# Patient Record
Sex: MALE | Race: WHITE | HISPANIC OR LATINO | Employment: OTHER | URBAN - METROPOLITAN AREA
[De-identification: names, ages, dates, MRNs, and addresses within clinical notes are randomized per-mention and may not be internally consistent; named-entity substitution may affect disease eponyms.]

---

## 2017-05-17 ENCOUNTER — HOSPITAL ENCOUNTER (EMERGENCY)
Facility: OTHER | Age: 62
Discharge: LEFT BEFORE DISCHARGE | End: 2017-05-17
Attending: EMERGENCY MEDICINE
Payer: MEDICAID

## 2017-05-17 VITALS
HEART RATE: 79 BPM | DIASTOLIC BLOOD PRESSURE: 73 MMHG | SYSTOLIC BLOOD PRESSURE: 119 MMHG | RESPIRATION RATE: 18 BRPM | HEIGHT: 68 IN | OXYGEN SATURATION: 100 % | BODY MASS INDEX: 27.28 KG/M2 | WEIGHT: 180 LBS

## 2017-05-17 DIAGNOSIS — R00.2 PALPITATIONS: Primary | ICD-10-CM

## 2017-05-17 DIAGNOSIS — R07.9 CHEST PAIN: ICD-10-CM

## 2017-05-17 PROCEDURE — 99283 EMERGENCY DEPT VISIT LOW MDM: CPT

## 2017-05-17 PROCEDURE — 93005 ELECTROCARDIOGRAM TRACING: CPT

## 2017-05-17 PROCEDURE — 93010 ELECTROCARDIOGRAM REPORT: CPT | Mod: ,,, | Performed by: INTERNAL MEDICINE

## 2017-05-18 NOTE — ED NOTES
"Spouse of patient walks to nursing station states " he left , he is inpatient he does not like to wait" pt seen walking down [address] to exit door. Encourage pt to come back, insisted on leaving. Md notified. Pt eloped.  "

## 2017-05-18 NOTE — ED PROVIDER NOTES
"Encounter Date: 5/17/2017       History     Chief Complaint   Patient presents with    Tachycardia     pt c/o feeling like his heart is racing and his BP is elevated, pt is normotensive and HR WDL in triage, pt c/o "a lil bit" of CP and sob     Review of patient's allergies indicates:  No Known Allergies  Patient is a 61 y.o. male presenting with the following complaint: chest pain. The history is provided by the patient.   Chest Pain   The current episode started just prior to arrival (Patient's main complaint is he felt his heart beating fast). Episode frequency: Occurred once. The chest pain is resolved. At its most intense, the chest pain is at 7/10. The chest pain is currently at 0/10. The quality of the pain is described as burning. The pain radiates to the epigastrium. Chest pain is worsened by smoking. Primary symptoms include palpitations. Pertinent negatives for primary symptoms include no fever, no shortness of breath, no cough, no abdominal pain, no nausea, no vomiting and no dizziness.   The palpitations began just prior to arrival. An episode of palpitations lasts for less than 5 minutes. The palpitations have occurred 1 time(s). Circumstances: Patient has been consuming heavy alcohol today.  He has a bottle of whiskey in his back pocket. The palpitations did not occur with dizziness or shortness of breath.     Pertinent negatives for associated symptoms include no lower extremity edema and no near-syncope. He tried nothing for the symptoms. Risk factors include alcohol intake. Past medical history comments: Patient has no past medical history     Past Medical History:   Diagnosis Date    Depression      History reviewed. No pertinent surgical history.  No family history on file.  Social History   Substance Use Topics    Smoking status: Current Every Day Smoker    Smokeless tobacco: None    Alcohol use Yes     Review of Systems   Constitutional: Negative.  Negative for fever.   HENT: Negative.  "   Eyes: Negative.    Respiratory: Negative.  Negative for cough and shortness of breath.    Cardiovascular: Positive for chest pain and palpitations. Negative for near-syncope.   Gastrointestinal: Negative.  Negative for abdominal pain, nausea and vomiting.   Endocrine: Negative.    Genitourinary: Negative.    Musculoskeletal: Negative.    Skin: Negative.    Allergic/Immunologic: Negative.    Neurological: Negative.  Negative for dizziness.   Hematological: Negative.    Psychiatric/Behavioral: Negative.    All other systems reviewed and are negative.      Physical Exam   Initial Vitals   BP Pulse Resp Temp SpO2   -- -- -- -- --            Physical Exam    Nursing note and vitals reviewed.  Constitutional: Vital signs are normal. He appears well-developed. He is active and cooperative.   Smells heavily of tobacco and alcohol.  Patient is here with his wife.  He has normal ambulation   HENT:   Head: Normocephalic and atraumatic.   Eyes: Conjunctivae, EOM and lids are normal. Pupils are equal, round, and reactive to light.   Neck: Trachea normal and full passive range of motion without pain. Neck supple. No thyroid mass present.   Cardiovascular: Normal rate, regular rhythm, S1 normal, S2 normal, normal heart sounds, intact distal pulses and normal pulses.   Abdominal: Soft. Normal appearance, normal aorta and bowel sounds are normal.   Musculoskeletal: Normal range of motion.   Lymphadenopathy:     He has no axillary adenopathy.   Neurological: He is alert and oriented to person, place, and time.   Skin: Skin is warm, dry and intact.   Psychiatric: He has a normal mood and affect. His speech is normal and behavior is normal. Judgment and thought content normal. Cognition and memory are normal.         ED Course   Procedures  Labs Reviewed - No data to display  EKG Readings: (Independently Interpreted)   Rhythm: Normal Sinus Rhythm. Ectopy: No Ectopy. Conduction: Normal. ST Segments: Normal ST Segments. T Waves:  Normal. Clinical Impression: Normal Sinus Rhythm          Medical Decision Making:   ED Management:  Prior to the patient's blood being drawn the patient decided that he did not want to be evaluated in the department.  He just one that his blood pressure checked and his heart rate checked.  I asked the patient to stay to allow me to draw some blood work on him just to make certain that he did not have any electrolyte or kidney dysfunction he refused any further care treatment and the left the emergency department without signing an AMA form or receiving any discharge instructions.  The patient left as an elopement.  My general impression is the patient suffers from alcoholism and may have been having some withdrawal symptoms.  His vital signs are completely normal plan was to draw labs and if they were unremarkable discharge the patient home.                   ED Course     Clinical Impression:   The encounter diagnosis was Palpitations.          Yamil Hitchcock MD  05/17/17 5485

## 2017-05-23 ENCOUNTER — HOSPITAL ENCOUNTER (EMERGENCY)
Facility: OTHER | Age: 62
Discharge: HOME | End: 2017-05-24
Attending: EMERGENCY MEDICINE
Payer: MEDICAID

## 2017-05-23 VITALS
HEIGHT: 67 IN | WEIGHT: 168 LBS | SYSTOLIC BLOOD PRESSURE: 110 MMHG | RESPIRATION RATE: 18 BRPM | BODY MASS INDEX: 26.37 KG/M2 | TEMPERATURE: 97 F | HEART RATE: 78 BPM | DIASTOLIC BLOOD PRESSURE: 79 MMHG

## 2017-05-23 DIAGNOSIS — S91.331A PUNCTURE WOUND OF RIGHT FOOT, INITIAL ENCOUNTER: ICD-10-CM

## 2017-05-23 DIAGNOSIS — S90.851A FOREIGN BODY IN RIGHT FOOT, INITIAL ENCOUNTER: Primary | ICD-10-CM

## 2017-05-23 PROCEDURE — 28190 REMOVAL OF FOOT FOREIGN BODY: CPT

## 2017-05-23 PROCEDURE — 99283 EMERGENCY DEPT VISIT LOW MDM: CPT | Mod: 25

## 2017-05-23 RX ORDER — DOXYCYCLINE 100 MG/1
100 CAPSULE ORAL 2 TIMES DAILY
Qty: 20 CAPSULE | Refills: 0 | Status: SHIPPED | OUTPATIENT
Start: 2017-05-23 | End: 2017-06-02

## 2017-05-23 RX ADMIN — CLOSTRIDIUM TETANI TOXOID ANTIGEN (FORMALDEHYDE INACTIVATED), CORYNEBACTERIUM DIPHTHERIAE TOXOID ANTIGEN (FORMALDEHYDE INACTIVATED), BORDETELLA PERTUSSIS TOXOID ANTIGEN (GLUTARALDEHYDE INACTIVATED), BORDETELLA PERTUSSIS FILAMENTOUS HEMAGGLUTININ ANTIGEN (FORMALDEHYDE INACTIVATED), BORDETELLA PERTUSSIS PERTACTIN ANTIGEN, AND BORDETELLA PERTUSSIS FIMBRIAE 2/3 ANTIGEN 0.5 ML: 5; 2; 2.5; 5; 3; 5 INJECTION, SUSPENSION INTRAMUSCULAR at 11:05

## 2017-05-24 PROCEDURE — 63600175 PHARM REV CODE 636 W HCPCS: Performed by: EMERGENCY MEDICINE

## 2017-05-24 PROCEDURE — 90471 IMMUNIZATION ADMIN: CPT | Performed by: EMERGENCY MEDICINE

## 2017-05-24 PROCEDURE — 90715 TDAP VACCINE 7 YRS/> IM: CPT | Performed by: EMERGENCY MEDICINE

## 2017-05-24 NOTE — ED PROVIDER NOTES
Encounter Date: 5/23/2017       History     Chief Complaint   Patient presents with    foreign body in foot     pt presents to ER with c/o a fish hook stuck in his rt foot.       Review of patient's allergies indicates:  No Known Allergies    Foreign Body    The current episode started just prior to arrival. Intake: embedded in skin of  right foot. Suspected object: fish hook. The incident was witnessed/reported by the patient.     Past Medical History:   Diagnosis Date    Depression      History reviewed. No pertinent surgical history.  History reviewed. No pertinent family history.  Social History   Substance Use Topics    Smoking status: Current Every Day Smoker    Smokeless tobacco: Not on file    Alcohol use Yes     Review of Systems   Skin: Positive for wound.   Neurological: Negative for weakness and numbness.   All other systems reviewed and are negative.      Physical Exam     Initial Vitals [05/23/17 2320]   BP Pulse Resp Temp SpO2   110/79 78 18 97.4 °F (36.3 °C) --     Physical Exam    Nursing note and vitals reviewed.  Constitutional: He appears well-developed and well-nourished. He is not diaphoretic. No distress.   HENT:   Head: Normocephalic and atraumatic.   Mouth/Throat: Oropharynx is clear and moist. No oropharyngeal exudate.   Eyes: EOM are normal. Pupils are equal, round, and reactive to light. No scleral icterus.   Neck: Normal range of motion. Neck supple.   Cardiovascular: Normal rate, regular rhythm and intact distal pulses.   No murmur heard.  Pulmonary/Chest: Breath sounds normal. No stridor. No respiratory distress. He has no wheezes. He has no rhonchi. He exhibits no tenderness.   Abdominal: Soft. Bowel sounds are normal. There is no tenderness.   Musculoskeletal: Normal range of motion. He exhibits no edema.   Neurological: He is alert and oriented to person, place, and time. He has normal strength.   Skin: Skin is warm. Capillary refill takes less than 2 seconds. No pallor.    Psychiatric: He has a normal mood and affect.         ED Course   Foreign Body  Date/Time: 5/23/2017 11:40 PM  Performed by: NICOLASA JANSEN  Authorized by: NICOLASA JANSEN   Consent Done: Not Needed  Body area: skin  General location: lower extremity  Location details: right foot  Anesthesia: local infiltration    Anesthesia:  Anesthesia: local infiltration  Local Anesthetic: lidocaine 1% with epinephrine   Anesthetic total: 3 mL  Patient sedated: no  Patient restrained: no  Patient cooperative: yes  Localization method: visualized  Removal mechanism: hemostat and scalpel  Dressing: dressing applied  Tendon involvement: none  Depth: subcutaneous  Complexity: simple  1 objects recovered.  Objects recovered: fish hook  Post-procedure assessment: foreign body removed  Patient tolerance: Patient tolerated the procedure well with no immediate complications  Comments: Neurovascular status intact before and after procedure done.      Labs Reviewed - No data to display                            ED Course     Labs Reviewed  No visits with results within 1 Day(s) from this visit.   Latest known visit with results is:   No results found for any previous visit.        Imaging Reviewed    Imaging Results    None         Medications given in ED    Medications   Tdap vaccine injection 0.5 mL (0.5 mLs Intramuscular Given 5/23/17 2346)       Discharge Medications     Discharge Medication List as of 5/23/2017 11:48 PM      START taking these medications    Details   doxycycline (VIBRAMYCIN) 100 MG Cap Take 1 capsule (100 mg total) by mouth 2 (two) times daily., Starting Tue 5/23/2017, Until Fri 6/2/2017, Normal                   Patient discharged to home in stable condition with instructions to:   1. Please take all meds as prescribed.  2. Follow-up with your primary care doctor   3. Return precautions discussed and patient and/or family/caretaker understands to return to the emergency room for any concerns including worsening of  your current symptoms, fever, chills, night sweats, worsening pain, chest pain, shortness of breath, nausea, vomiting, diarrhea, bleeding, headache, difficulty talking, visual disturbances, weakness, numbness or any other acute concerns    Clinical Impression:   The primary encounter diagnosis was Foreign body in right foot, initial encounter. A diagnosis of Puncture wound of right foot, initial encounter was also pertinent to this visit.          Aj Aguillon MD  05/24/17 0106

## 2017-05-24 NOTE — ED TRIAGE NOTES
Pt presents to ER with c/o a fish hook getting stuck in his rt foot.  The hook is piercing the top portion of his foot and the other portion of the hook is through his shoe.  He attempted to remove it himself with pliers but was unable secondary to the pain.

## 2017-07-15 ENCOUNTER — NURSE TRIAGE (OUTPATIENT)
Dept: ADMINISTRATIVE | Facility: CLINIC | Age: 62
End: 2017-07-15

## 2017-07-16 NOTE — TELEPHONE ENCOUNTER
"pt fishing this am asked to be picked up, + SOB. BP low, face flushed. HR=90  BP 61/? At 7pm,  Given one of wife's nerve pills. Pt hasnt eaten. Pt not weak. Pt refuses ambulance. rec ED. Pt has a lot of anxiety. Denies plan to hurt himself or others.     Reason for Disposition   [1] Systolic BP < 80 AND [2] NOT dizzy, lightheaded or weak    Answer Assessment - Initial Assessment Questions  1. BLOOD PRESSURE: "What is the blood pressure?" "Did you take at least two measurements 5 minutes apart?"     61/ ? , not weak, not sweating.   2. ONSET: "When did you take your blood pressure?"     7pm   3. HOW: "How did you obtain the blood pressure?" (e.g., visiting nurse, automatic home BP monitor)     Home   4. HISTORY: "Do you have a history of low blood pressure?" "What is your blood pressure normally?"     Nothing   5. MEDICATIONS: "Are you taking any medications for blood pressure?" If yes: "Have they been changed recently?"     No meds   6. PULSE RATE: "Do you know what your pulse rate is?"      90 now down  7. OTHER SYMPTOMS: "Have you been sick recently?" "Have you had a recent injury?"     No    Protocols used: ST LOW BLOOD PRESSURE-A-    Reiterated need for EMS due to low BP, SOB, pt refuses rec ED. rec lay with feet elevated and offer fluids. Call back with questions.   "

## 2018-05-10 ENCOUNTER — NURSE TRIAGE (OUTPATIENT)
Dept: ADMINISTRATIVE | Facility: CLINIC | Age: 63
End: 2018-05-10

## 2018-05-10 NOTE — TELEPHONE ENCOUNTER
Chest pain in chest and b/p 61/46 pulse 74.States he feels like someone is walking on his chest.  Advised to call EMS. Caller wanting to ask about how to interpret b/p readings. Advised his b/p is low and advised twice again the importance of hanging up now and calling EMS. Caller ended call abruptly.    Reason for Disposition   Sounds like a life-threatening emergency to the triager    Protocols used: ST CHEST PAIN-A-AH

## 2019-03-23 ENCOUNTER — OFFICE VISIT (OUTPATIENT)
Dept: URGENT CARE | Facility: CLINIC | Age: 64
End: 2019-03-23
Payer: MEDICARE

## 2019-03-23 VITALS
SYSTOLIC BLOOD PRESSURE: 93 MMHG | TEMPERATURE: 97 F | WEIGHT: 168 LBS | BODY MASS INDEX: 26.37 KG/M2 | DIASTOLIC BLOOD PRESSURE: 65 MMHG | HEART RATE: 78 BPM | RESPIRATION RATE: 20 BRPM | HEIGHT: 67 IN

## 2019-03-23 DIAGNOSIS — R05.9 COUGH: ICD-10-CM

## 2019-03-23 DIAGNOSIS — J06.9 UPPER RESPIRATORY TRACT INFECTION, UNSPECIFIED TYPE: Primary | ICD-10-CM

## 2019-03-23 DIAGNOSIS — H93.8X1 CONGESTION OF RIGHT EAR: ICD-10-CM

## 2019-03-23 PROCEDURE — 3008F BODY MASS INDEX DOCD: CPT | Mod: CPTII,S$GLB,, | Performed by: PHYSICIAN ASSISTANT

## 2019-03-23 PROCEDURE — 99213 OFFICE O/P EST LOW 20 MIN: CPT | Mod: S$GLB,,, | Performed by: PHYSICIAN ASSISTANT

## 2019-03-23 PROCEDURE — 99213 PR OFFICE/OUTPT VISIT, EST, LEVL III, 20-29 MIN: ICD-10-PCS | Mod: S$GLB,,, | Performed by: PHYSICIAN ASSISTANT

## 2019-03-23 PROCEDURE — 3008F PR BODY MASS INDEX (BMI) DOCUMENTED: ICD-10-PCS | Mod: CPTII,S$GLB,, | Performed by: PHYSICIAN ASSISTANT

## 2019-03-23 RX ORDER — METHOCARBAMOL 750 MG/1
750 TABLET, FILM COATED ORAL 2 TIMES DAILY PRN
Refills: 3 | COMMUNITY
Start: 2019-02-14 | End: 2020-10-05

## 2019-03-23 RX ORDER — PROMETHAZINE HYDROCHLORIDE AND DEXTROMETHORPHAN HYDROBROMIDE 6.25; 15 MG/5ML; MG/5ML
5 SYRUP ORAL NIGHTLY PRN
Qty: 180 ML | Refills: 0 | Status: SHIPPED | OUTPATIENT
Start: 2019-03-23 | End: 2019-04-02

## 2019-03-23 RX ORDER — LORATADINE 10 MG/1
10 TABLET ORAL DAILY
Qty: 30 TABLET | Refills: 0 | Status: SHIPPED | OUTPATIENT
Start: 2019-03-23 | End: 2022-04-05

## 2019-03-23 RX ORDER — ESCITALOPRAM OXALATE 20 MG/1
20 TABLET ORAL DAILY
Refills: 3 | COMMUNITY
Start: 2019-02-14 | End: 2020-10-05 | Stop reason: SDUPTHER

## 2019-03-23 RX ORDER — AMOXICILLIN AND CLAVULANATE POTASSIUM 500; 125 MG/1; MG/1
1 TABLET, FILM COATED ORAL 2 TIMES DAILY
Qty: 20 TABLET | Refills: 0 | Status: SHIPPED | OUTPATIENT
Start: 2019-03-23 | End: 2019-04-02

## 2019-03-23 RX ORDER — PREDNISONE 5 MG/1
5 TABLET ORAL DAILY
Qty: 5 TABLET | Refills: 0 | Status: SHIPPED | OUTPATIENT
Start: 2019-03-23 | End: 2019-03-28

## 2019-03-23 NOTE — PROGRESS NOTES
"Subjective:       Patient ID: Leonie Denis is a 63 y.o. male.    Vitals:  height is 5' 7" (1.702 m) and weight is 76.2 kg (168 lb). His temperature is 97.4 °F (36.3 °C). His blood pressure is 93/65 and his pulse is 78. His respiration is 20.     Chief Complaint: Cough    For a week pt has been having a cough and right ear fullness. He has used an ear wax removal that did help and when he cough his ear would clear up. He has been coughing up clear light green mucus also. He too cold and flu medication , cough medication and aspirin .      Cough   This is a new problem. The current episode started in the past 7 days. The problem has been unchanged. The problem occurs constantly. The cough is productive of sputum. Associated symptoms include ear pain. Pertinent negatives include no chills, eye redness, fever, hemoptysis, myalgias, rash, sore throat, shortness of breath or wheezing. Nothing aggravates the symptoms. He has tried OTC cough suppressant for the symptoms. The treatment provided no relief.       Constitution: Negative for chills, sweating, fatigue and fever.   HENT: Positive for ear pain. Negative for congestion, sinus pain, sinus pressure, sore throat and voice change.    Neck: Negative for painful lymph nodes.   Eyes: Negative for eye redness.   Respiratory: Positive for cough. Negative for chest tightness, sputum production, bloody sputum, COPD, shortness of breath, stridor, wheezing and asthma.    Gastrointestinal: Negative for nausea and vomiting.   Musculoskeletal: Negative for muscle ache.   Skin: Negative for rash.   Allergic/Immunologic: Negative for seasonal allergies and asthma.   Hematologic/Lymphatic: Negative for swollen lymph nodes.       Patient presents with a cough, sinus and right ear congestion with decreased hearing for about a week. Patient has been trying to pop his ear by holding his nose and blowing, which works for a few minutes and then recurs with congestion. He has also tried to " clean out his ear with peroxide which does not help his sx at all.  Objective:      Physical Exam   Constitutional: He is oriented to person, place, and time. He appears well-developed and well-nourished. He is cooperative.  Non-toxic appearance. He does not appear ill. No distress.   HENT:   Head: Normocephalic and atraumatic.   Right Ear: Hearing, external ear and ear canal normal. There is swelling. No tenderness. Tympanic membrane is erythematous and bulging. A middle ear effusion is present.   Left Ear: Hearing, tympanic membrane, external ear and ear canal normal.   Nose: Mucosal edema and rhinorrhea present. No nasal deformity. No epistaxis. Right sinus exhibits no maxillary sinus tenderness and no frontal sinus tenderness. Left sinus exhibits no maxillary sinus tenderness and no frontal sinus tenderness.   Mouth/Throat: Uvula is midline and mucous membranes are normal. No trismus in the jaw. Normal dentition. No uvula swelling.   Eyes: Conjunctivae and lids are normal. No scleral icterus.   Sclera clear bilat   Neck: Trachea normal, full passive range of motion without pain and phonation normal. Neck supple.   Cardiovascular: Normal rate, regular rhythm, normal heart sounds, intact distal pulses and normal pulses.   Pulmonary/Chest: Effort normal and breath sounds normal. No stridor. No respiratory distress. He has no decreased breath sounds. He has no wheezes. He has no rhonchi. He has no rales.   Abdominal: Soft. Normal appearance and bowel sounds are normal. He exhibits no distension. There is no tenderness.   Musculoskeletal: Normal range of motion. He exhibits no edema or deformity.   Neurological: He is alert and oriented to person, place, and time. He exhibits normal muscle tone. Coordination normal.   Skin: Skin is warm, dry and intact. He is not diaphoretic. No pallor.   Psychiatric: He has a normal mood and affect. His speech is normal and behavior is normal. Judgment and thought content normal.  Cognition and memory are normal.   Nursing note and vitals reviewed.      Assessment:       1. Upper respiratory tract infection, unspecified type    2. Congestion of right ear    3. Cough        Plan:         Upper respiratory tract infection, unspecified type  -     predniSONE (DELTASONE) 5 MG tablet; Take 1 tablet (5 mg total) by mouth once daily. for 5 days  Dispense: 5 tablet; Refill: 0  -     loratadine (CLARITIN) 10 mg tablet; Take 1 tablet (10 mg total) by mouth once daily.  Dispense: 30 tablet; Refill: 0    Congestion of right ear  -     amoxicillin-clavulanate 500-125mg (AUGMENTIN) 500-125 mg Tab; Take 1 tablet (500 mg total) by mouth 2 (two) times daily. for 10 days  Dispense: 20 tablet; Refill: 0  -     predniSONE (DELTASONE) 5 MG tablet; Take 1 tablet (5 mg total) by mouth once daily. for 5 days  Dispense: 5 tablet; Refill: 0    Cough  -     promethazine-dextromethorphan (PROMETHAZINE-DM) 6.25-15 mg/5 mL Syrp; Take 5 mLs by mouth nightly as needed (for cough at bedtime).  Dispense: 180 mL; Refill: 0    Patient has been asked to stop all previous ear treatments and do only what has been recommended from this point forward.    Patient Instructions     Viral Upper Respiratory Illness (Adult)  You have a viral upper respiratory illness (URI), which is another term for the common cold. This illness is contagious during the first few days. It is spread through the air by coughing and sneezing. It may also be spread by direct contact (touching the sick person and then touching your own eyes, nose, or mouth). Frequent handwashing will decrease risk of spread. Most viral illnesses go away within 7 to 10 days with rest and simple home remedies. Sometimes the illness may last for several weeks. Antibiotics will not kill a virus, and they are generally not prescribed for this condition.    Home care  · If symptoms are severe, rest at home for the first 2 to 3 days. When you resume activity, don't let yourself get  too tired.  · Avoid being exposed to cigarette smoke (yours or others).  · You may use acetaminophen or ibuprofen to control pain and fever, unless another medicine was prescribed. (Note: If you have chronic liver or kidney disease, have ever had a stomach ulcer or gastrointestinal bleeding, or are taking blood-thinning medicines, talk with your healthcare provider before using these medicines.) Aspirin should never be given to anyone under 18 years of age who is ill with a viral infection or fever. It may cause severe liver or brain damage.  · Your appetite may be poor, so a light diet is fine. Avoid dehydration by drinking 6 to 8 glasses of fluids per day (water, soft drinks, juices, tea, or soup). Extra fluids will help loosen secretions in the nose and lungs.  · Over-the-counter cold medicines will not shorten the length of time youre sick, but they may be helpful for the following symptoms: cough, sore throat, and nasal and sinus congestion. (Note: Do not use decongestants if you have high blood pressure.)  Follow-up care  Follow up with your healthcare provider, or as advised.  When to seek medical advice  Call your healthcare provider right away if any of these occur:  · Cough with lots of colored sputum (mucus)  · Severe headache; face, neck, or ear pain  · Difficulty swallowing due to throat pain  · Fever of 100.4°F (38°C)  Call 911, or get immediate medical care  Call emergency services right away if any of these occur:  · Chest pain, shortness of breath, wheezing, or difficulty breathing  · Coughing up blood  · Inability to swallow due to throat pain  Date Last Reviewed: 9/13/2015 © 2000-2017 [employer]. [address] [place] [address], [place] [address]. All rights reserved. This information is not intended as a substitute for professional medical care. Always follow your healthcare professional's instructions.        Anatomy of the Ear    The ear is a complex and delicate organ. It collects  sound waves so you can hear the world around you. The ear also has a second function--it helps you keep your balance. Your ear can be divided into 3 parts. The outer ear and middle ear help collect and amplify sound. The inner ear converts sound waves to messages that are sent to the brain. The inner ear also senses the movement and position of your head and body so you can maintain your balance and see clearly, even when you change positions.  The mastoid bone surrounds the middle ear. The external ear collects sound waves. The ear canal carries sound waves to the eardrum. The eardrum vibrates from sound waves, setting the middle ear bones in motion. The middle ear bones (ossicles) vibrate, transmitting sound waves to the inner ear. When the ear is healthy, air pressure remains balanced in the middle ear. The eustachian tube helps control air pressure in the middle ear. The semicircular canals help maintain balance. The vestibular nerve carries balance signals to the brain. The auditory nerve carries sound signals to the brain. The cochlea picks up sound waves and makes nerve signals.     Date Last Reviewed: 10/1/2016  © 2340-9853 [employer]. [address] [place] [address], [place] [address]. All rights reserved. This information is not intended as a substitute for professional medical care. Always follow your healthcare professional's instructions.

## 2019-03-23 NOTE — PATIENT INSTRUCTIONS
Viral Upper Respiratory Illness (Adult)  You have a viral upper respiratory illness (URI), which is another term for the common cold. This illness is contagious during the first few days. It is spread through the air by coughing and sneezing. It may also be spread by direct contact (touching the sick person and then touching your own eyes, nose, or mouth). Frequent handwashing will decrease risk of spread. Most viral illnesses go away within 7 to 10 days with rest and simple home remedies. Sometimes the illness may last for several weeks. Antibiotics will not kill a virus, and they are generally not prescribed for this condition.    Home care  · If symptoms are severe, rest at home for the first 2 to 3 days. When you resume activity, don't let yourself get too tired.  · Avoid being exposed to cigarette smoke (yours or others).  · You may use acetaminophen or ibuprofen to control pain and fever, unless another medicine was prescribed. (Note: If you have chronic liver or kidney disease, have ever had a stomach ulcer or gastrointestinal bleeding, or are taking blood-thinning medicines, talk with your healthcare provider before using these medicines.) Aspirin should never be given to anyone under 18 years of age who is ill with a viral infection or fever. It may cause severe liver or brain damage.  · Your appetite may be poor, so a light diet is fine. Avoid dehydration by drinking 6 to 8 glasses of fluids per day (water, soft drinks, juices, tea, or soup). Extra fluids will help loosen secretions in the nose and lungs.  · Over-the-counter cold medicines will not shorten the length of time youre sick, but they may be helpful for the following symptoms: cough, sore throat, and nasal and sinus congestion. (Note: Do not use decongestants if you have high blood pressure.)  Follow-up care  Follow up with your healthcare provider, or as advised.  When to seek medical advice  Call your healthcare provider right away if any  of these occur:  · Cough with lots of colored sputum (mucus)  · Severe headache; face, neck, or ear pain  · Difficulty swallowing due to throat pain  · Fever of 100.4°F (38°C)  Call 911, or get immediate medical care  Call emergency services right away if any of these occur:  · Chest pain, shortness of breath, wheezing, or difficulty breathing  · Coughing up blood  · Inability to swallow due to throat pain  Date Last Reviewed: 9/13/2015 © 2000-2017 [employer]. [address] [place] [address]. All rights reserved. This information is not intended as a substitute for professional medical care. Always follow your healthcare professional's instructions.        Anatomy of the Ear    The ear is a complex and delicate organ. It collects sound waves so you can hear the world around you. The ear also has a second function--it helps you keep your balance. Your ear can be divided into 3 parts. The outer ear and middle ear help collect and amplify sound. The inner ear converts sound waves to messages that are sent to the brain. The inner ear also senses the movement and position of your head and body so you can maintain your balance and see clearly, even when you change positions.  The mastoid bone surrounds the middle ear. The external ear collects sound waves. The ear canal carries sound waves to the eardrum. The eardrum vibrates from sound waves, setting the middle ear bones in motion. The middle ear bones (ossicles) vibrate, transmitting sound waves to the inner ear. When the ear is healthy, air pressure remains balanced in the middle ear. The eustachian tube helps control air pressure in the middle ear. The semicircular canals help maintain balance. The vestibular nerve carries balance signals to the brain. The auditory nerve carries sound signals to the brain. The cochlea picks up sound waves and makes nerve signals.     Date Last Reviewed: 10/1/2016  © 2868-0833 [employer]. [facility] [place] [address]. All rights reserved. This information is not intended as a substitute for professional medical care. Always follow your healthcare professional's instructions.

## 2020-06-25 ENCOUNTER — HOSPITAL ENCOUNTER (EMERGENCY)
Facility: HOSPITAL | Age: 65
Discharge: HOME | End: 2020-06-25
Attending: EMERGENCY MEDICINE
Payer: MEDICARE

## 2020-06-25 ENCOUNTER — NURSE TRIAGE (OUTPATIENT)
Dept: ADMINISTRATIVE | Facility: CLINIC | Age: 65
End: 2020-06-25

## 2020-06-25 VITALS
RESPIRATION RATE: 24 BRPM | DIASTOLIC BLOOD PRESSURE: 72 MMHG | TEMPERATURE: 98 F | SYSTOLIC BLOOD PRESSURE: 124 MMHG | BODY MASS INDEX: 27.47 KG/M2 | OXYGEN SATURATION: 97 % | WEIGHT: 175 LBS | HEIGHT: 67 IN | HEART RATE: 68 BPM

## 2020-06-25 DIAGNOSIS — T63.481A INSECT STINGS, ACCIDENTAL OR UNINTENTIONAL, INITIAL ENCOUNTER: Primary | ICD-10-CM

## 2020-06-25 DIAGNOSIS — T78.40XA ALLERGIC REACTION, INITIAL ENCOUNTER: ICD-10-CM

## 2020-06-25 PROCEDURE — 25000003 PHARM REV CODE 250: Mod: ER | Performed by: EMERGENCY MEDICINE

## 2020-06-25 PROCEDURE — 63600175 PHARM REV CODE 636 W HCPCS: Mod: ER | Performed by: EMERGENCY MEDICINE

## 2020-06-25 PROCEDURE — 99284 EMERGENCY DEPT VISIT MOD MDM: CPT | Mod: ER

## 2020-06-25 RX ORDER — DIPHENHYDRAMINE HCL 25 MG
25 CAPSULE ORAL EVERY 6 HOURS PRN
Qty: 20 CAPSULE | Refills: 0 | Status: SHIPPED | OUTPATIENT
Start: 2020-06-25 | End: 2020-10-05

## 2020-06-25 RX ORDER — PREDNISONE 20 MG/1
60 TABLET ORAL
Status: COMPLETED | OUTPATIENT
Start: 2020-06-25 | End: 2020-06-25

## 2020-06-25 RX ORDER — PREDNISONE 20 MG/1
40 TABLET ORAL DAILY
Qty: 8 TABLET | Refills: 0 | Status: SHIPPED | OUTPATIENT
Start: 2020-06-26 | End: 2020-06-30

## 2020-06-25 RX ORDER — FAMOTIDINE 20 MG/1
20 TABLET, FILM COATED ORAL
Status: COMPLETED | OUTPATIENT
Start: 2020-06-25 | End: 2020-06-25

## 2020-06-25 RX ORDER — FAMOTIDINE 20 MG/1
20 TABLET, FILM COATED ORAL 2 TIMES DAILY
Qty: 10 TABLET | Refills: 0 | Status: SHIPPED | OUTPATIENT
Start: 2020-06-25 | End: 2022-04-05 | Stop reason: ALTCHOICE

## 2020-06-25 RX ORDER — EPINEPHRINE 0.3 MG/.3ML
1 INJECTION SUBCUTANEOUS ONCE AS NEEDED
Qty: 0.3 ML | Refills: 0 | Status: SHIPPED | OUTPATIENT
Start: 2020-06-25 | End: 2020-10-05

## 2020-06-25 RX ADMIN — PREDNISONE 60 MG: 20 TABLET ORAL at 10:06

## 2020-06-25 RX ADMIN — FAMOTIDINE 20 MG: 20 TABLET ORAL at 10:06

## 2020-06-25 NOTE — DISCHARGE INSTRUCTIONS
Your symptoms are likely related to allergic reaction likely to a bee sting.  Complete the entire course of prednisone prescribed to decrease allergic reaction symptoms.  Use Benadryl as needed for itching or allergy symptoms.  Use Pepcid as it works synergistically with Benadryl.  You have been prescribed an EpiPen.  Ask the pharmacist for instructions on how to use the EpiPen.  Use EpiPen if you developed significant breathing difficulty or tongue or lip swelling.  Report to the emergency department immediately if you require use of an EpiPen.  Schedule close follow-up with your primary physician to monitor and further evaluate your symptoms.    Thank you for coming to our Emergency Department today. It is important to remember that some problems are difficult to diagnose and may not be found during your first visit. Be sure to follow up with your primary care doctor and review any labs/imaging that was performed with them. If you do not have a primary care doctor, you may contact the one listed on your discharge paperwork or you may also call the [facility] Appointment Desk at [phone] to schedule an appointment with one.     All medications may potentially have side effects and it is impossible to predict which medications may give you side effects. If you feel that you are having a negative effect of any medication you should immediately stop taking them and seek medical attention.    Return to the ER with any questions/concerns, new/concerning symptoms, worsening or failure to improve. Do not drive or make any important decisions for 24 hours if you have received any pain medications, sedatives or mood altering drugs during your ER visit.

## 2020-06-25 NOTE — TELEPHONE ENCOUNTER
Stung by a bee above left eye on yesterday. Face is swelling and eyes almost shut. Pt stated he also has swelling in his throat. Pt denies any difficulty breathing and or swallowing.  Care advice recommend pt go to Er. Pt verbalized understanding.     Reason for Disposition   Widespread hives, itching, or facial swelling and started within 2 hours of sting    Additional Information   Negative: Passed out (i.e., fainted, collapsed and was not responding)   Negative: Wheezing or difficulty breathing   Negative: Hoarseness, cough, or tightness in the throat or chest   Negative: Swollen tongue or difficulty swallowing   Negative: Life-threatening reaction in past to sting (anaphylaxis) and < 2 hours since sting   Negative: Sounds like a life-threatening emergency to the triager    Protocols used: BEE STING-A-OH

## 2020-06-25 NOTE — ED PROVIDER NOTES
Encounter Date: 6/25/2020    SCRIBE #1 NOTE: I, Nely Pickett, am scribing for, and in the presence of,  Dr. Ortega. I have scribed the following portions of the note - Other sections scribed: HPI, ROS, PE.       History     Chief Complaint   Patient presents with    Insect Bite     stung by insect to forehead, occurred yesterday, some swelling to face, denies resp issues, took benadryl this am approx 1 hour ago     Leonie Denis is a 64 y.o. male who presents to the ED complaining of an insect bite to forehead that occurred yesterday. Reports some swelling around eyes and throat discomfort. States he took Benadryl PTA. Denies SOB, throat swelling, trouble swallowing, wheezing, and cough.    The history is provided by the patient. No  was used.     Review of patient's allergies indicates:  No Known Allergies  Past Medical History:   Diagnosis Date    Depression     Hypertension      History reviewed. No pertinent surgical history.  History reviewed. No pertinent family history.  Social History     Tobacco Use    Smoking status: Current Every Day Smoker   Substance Use Topics    Alcohol use: Yes    Drug use: No     Review of Systems   Constitutional: Negative for fever.   HENT: Positive for facial swelling. Negative for sore throat and trouble swallowing.    Respiratory: Negative for cough, shortness of breath and wheezing.    Cardiovascular: Negative for chest pain.   Gastrointestinal: Negative for diarrhea, nausea and vomiting.   Genitourinary: Negative for dysuria.   Musculoskeletal: Negative for back pain.   Skin: Negative for rash.   Neurological: Negative for weakness and numbness.   Hematological: Does not bruise/bleed easily.   All other systems reviewed and are negative.      Physical Exam     Initial Vitals [06/25/20 1031]   BP Pulse Resp Temp SpO2   99/77 71 19 97.8 °F (36.6 °C) 95 %      MAP       --         Physical Exam    Nursing note and vitals reviewed.  Constitutional: He  is not diaphoretic. No distress.   HENT:   Head: Normocephalic and atraumatic.   Mouth/Throat: Oropharynx is clear and moist. No uvula swelling. No posterior oropharyngeal edema.   Periorbital edema  No uvular edema or asymmetry  No tongue swelling   Eyes: Conjunctivae and EOM are normal. Pupils are equal, round, and reactive to light. No scleral icterus.   Neck: Normal range of motion. Neck supple. No JVD present.   Cardiovascular: Normal rate, regular rhythm and intact distal pulses.   Pulmonary/Chest: Effort normal and breath sounds normal. No stridor. No tachypnea. No respiratory distress. He has no wheezes.   Abdominal: Soft. Bowel sounds are normal. He exhibits no distension. There is no abdominal tenderness.   Musculoskeletal: Normal range of motion. No tenderness or edema.   Neurological: He is alert. He has normal strength. No cranial nerve deficit or sensory deficit. GCS score is 15. GCS eye subscore is 4. GCS verbal subscore is 5. GCS motor subscore is 6.   Skin: Skin is warm and dry. No rash noted.   Psychiatric: He has a normal mood and affect.         ED Course   Procedures  Labs Reviewed - No data to display       Imaging Results    None          Medical Decision Making:   History:   Old Medical Records: I decided to obtain old medical records.            Scribe Attestation:   Scribe #1: I performed the above scribed service and the documentation accurately describes the services I performed. I attest to the accuracy of the note.    I, Ivonne Ortega , personally performed the services described in this documentation. All medical record entries made by the scribe were at my direction and in my presence.  I have reviewed the chart and agree that the record reflects my personal performance and is accurate and complete.           ED Course as of Jun 25 1852   Thu Jun 25, 2020   1047 Patient is afebrile and in no acute distress at time history and physical.  He has periorbital swelling and complains of  sensation of throat discomfort.  He does not have a muffled voice.  There is no tongue uvular or posterior pharyngeal edema.  He is not wheezing on auscultation.  He is not tachypneic or in respiratory distress.  Patient reports taking Benadryl just prior to arrival.  Will give Pepcid and prednisone and monitor for clinical improvement.    [SG]   1221 On reassessment patient reports improvement symptoms.  Periorbital edema and facial swelling has improved.  Patient reports resolution of sensation of throat swelling.  He is speaking clearly.  He remains without wheezing, he is clinically stable.  Will monitor patient further for improvement.  Anticipate discharge if no worsening of symptoms.  Patient to be prescribed course of prednisone as well as Pepcid.  Will be prescribed Benadryl as needed as well as EpiPen.  Patient counseled extensively on indications for use of EpiPen in the importance of emergency department follow-up if EpiPen is required. counseled on supportive care, appropriate medication usage, concerning symptoms for which to return to ER and the importance of follow up. Understanding and agreement with treatment plan was expressed.     [SG]      ED Course User Index  [SG] Ivonne Ortega MD     Please put in 30 minutes of critical care due to patient having a high risk of respiratory failure and anaphylaxis.   Separate from teaching and exclusive of procedure and ekg time  Includes:  1. Time at bedside  2. Time reviewing test results  3. Time discussing case with staff  4. Time documenting the medical record  5. Time spent with family members  6. Time spent with consults  7. Management            This chart was completed using dictation software, as a result there may be some transcription errors.   Clinical Impression:     1. Insect stings, accidental or unintentional, initial encounter    2. Allergic reaction, initial encounter            Disposition:   Disposition: Discharged  Condition:  Stable     ED Disposition Condition    Discharge Stable        ED Prescriptions     Medication Sig Dispense Start Date End Date Auth. Provider    predniSONE (DELTASONE) 20 MG tablet Take 2 tablets (40 mg total) by mouth once daily. for 4 days 8 tablet 6/26/2020 6/30/2020 Ivonne Ortega MD    famotidine (PEPCID) 20 MG tablet Take 1 tablet (20 mg total) by mouth 2 (two) times daily. for 5 days 10 tablet 6/25/2020 6/30/2020 Ivonne Ortega MD    diphenhydrAMINE (BENADRYL) 25 mg capsule Take 1 capsule (25 mg total) by mouth every 6 (six) hours as needed for Itching or Allergies. 20 capsule 6/25/2020  Ivonne Ortega MD    EPINEPHrine (EPIPEN) 0.3 mg/0.3 mL AtIn (Expires today) Inject 0.3 mLs (0.3 mg total) into the muscle once as needed (Anaphylaxis). Report immediately to emergency department if you require use of EpiPen 0.3 mL 6/25/2020 6/25/2020 Ivonne Ortega MD        Follow-up Information     Follow up With Specialties Details Why Contact Info    Your Primary Physician  Schedule an appointment as soon as possible for a visit   Make an appointment to see your primary care physician as soon as possible for follow-up    Approved Provider By Your Insurance Company  Schedule an appointment as soon as possible for a visit  Call number on your insurance card to receive a full list of providers in your area     Rudolph Peralta MD Family Medicine Schedule an appointment as soon as possible for a visit   [address]  [place] [address]  [phone]                                       Ivonne Ortega MD  06/25/20 7573

## 2020-10-05 ENCOUNTER — LAB VISIT (OUTPATIENT)
Dept: LAB | Facility: HOSPITAL | Age: 65
End: 2020-10-05
Attending: FAMILY MEDICINE
Payer: MEDICARE

## 2020-10-05 ENCOUNTER — OFFICE VISIT (OUTPATIENT)
Dept: FAMILY MEDICINE | Facility: CLINIC | Age: 65
End: 2020-10-05
Payer: MEDICARE

## 2020-10-05 VITALS
BODY MASS INDEX: 26.08 KG/M2 | HEIGHT: 67 IN | TEMPERATURE: 98 F | OXYGEN SATURATION: 96 % | HEART RATE: 70 BPM | WEIGHT: 166.13 LBS | SYSTOLIC BLOOD PRESSURE: 120 MMHG | DIASTOLIC BLOOD PRESSURE: 70 MMHG

## 2020-10-05 DIAGNOSIS — Z12.5 ENCOUNTER FOR SCREENING FOR MALIGNANT NEOPLASM OF PROSTATE: ICD-10-CM

## 2020-10-05 DIAGNOSIS — Z12.11 ENCOUNTER FOR SCREENING FOR MALIGNANT NEOPLASM OF COLON: ICD-10-CM

## 2020-10-05 DIAGNOSIS — Z72.0 TOBACCO ABUSE: ICD-10-CM

## 2020-10-05 DIAGNOSIS — R93.89 ABNORMAL FINDINGS ON DIAGNOSTIC IMAGING OF OTHER SPECIFIED BODY STRUCTURES: ICD-10-CM

## 2020-10-05 DIAGNOSIS — M62.830 MUSCLE SPASM OF BACK: ICD-10-CM

## 2020-10-05 DIAGNOSIS — Z23 PNEUMOCOCCAL VACCINATION ADMINISTERED AT CURRENT VISIT: ICD-10-CM

## 2020-10-05 DIAGNOSIS — R79.9 ABNORMAL FINDING OF BLOOD CHEMISTRY, UNSPECIFIED: ICD-10-CM

## 2020-10-05 DIAGNOSIS — F41.9 ANXIETY AND DEPRESSION: Primary | ICD-10-CM

## 2020-10-05 DIAGNOSIS — Z23 NEED FOR IMMUNIZATION AGAINST INFLUENZA: ICD-10-CM

## 2020-10-05 DIAGNOSIS — Z00.00 ROUTINE PHYSICAL EXAMINATION: ICD-10-CM

## 2020-10-05 DIAGNOSIS — F32.A ANXIETY AND DEPRESSION: Primary | ICD-10-CM

## 2020-10-05 DIAGNOSIS — L98.9 SKIN LESION: ICD-10-CM

## 2020-10-05 LAB
ALBUMIN SERPL BCP-MCNC: 4 G/DL (ref 3.5–5.2)
ALP SERPL-CCNC: 73 U/L (ref 55–135)
ALT SERPL W/O P-5'-P-CCNC: 19 U/L (ref 10–44)
ANION GAP SERPL CALC-SCNC: 8 MMOL/L (ref 8–16)
AST SERPL-CCNC: 22 U/L (ref 10–40)
BASOPHILS # BLD AUTO: 0.06 K/UL (ref 0–0.2)
BASOPHILS NFR BLD: 0.7 % (ref 0–1.9)
BILIRUB SERPL-MCNC: 0.7 MG/DL (ref 0.1–1)
BUN SERPL-MCNC: 11 MG/DL (ref 8–23)
CALCIUM SERPL-MCNC: 9.7 MG/DL (ref 8.7–10.5)
CHLORIDE SERPL-SCNC: 100 MMOL/L (ref 95–110)
CHOLEST SERPL-MCNC: 206 MG/DL (ref 120–199)
CHOLEST/HDLC SERPL: 3.3 {RATIO} (ref 2–5)
CO2 SERPL-SCNC: 28 MMOL/L (ref 23–29)
CREAT SERPL-MCNC: 0.9 MG/DL (ref 0.5–1.4)
DIFFERENTIAL METHOD: ABNORMAL
EOSINOPHIL # BLD AUTO: 0 K/UL (ref 0–0.5)
EOSINOPHIL NFR BLD: 0.3 % (ref 0–8)
ERYTHROCYTE [DISTWIDTH] IN BLOOD BY AUTOMATED COUNT: 13.6 % (ref 11.5–14.5)
EST. GFR  (AFRICAN AMERICAN): >60 ML/MIN/1.73 M^2
EST. GFR  (NON AFRICAN AMERICAN): >60 ML/MIN/1.73 M^2
GLUCOSE SERPL-MCNC: 102 MG/DL (ref 70–110)
HCT VFR BLD AUTO: 48.7 % (ref 40–54)
HDLC SERPL-MCNC: 63 MG/DL (ref 40–75)
HDLC SERPL: 30.6 % (ref 20–50)
HGB BLD-MCNC: 16.1 G/DL (ref 14–18)
IMM GRANULOCYTES # BLD AUTO: 0.04 K/UL (ref 0–0.04)
IMM GRANULOCYTES NFR BLD AUTO: 0.4 % (ref 0–0.5)
LDLC SERPL CALC-MCNC: 130.4 MG/DL (ref 63–159)
LYMPHOCYTES # BLD AUTO: 1.5 K/UL (ref 1–4.8)
LYMPHOCYTES NFR BLD: 16.2 % (ref 18–48)
MCH RBC QN AUTO: 34.4 PG (ref 27–31)
MCHC RBC AUTO-ENTMCNC: 33.1 G/DL (ref 32–36)
MCV RBC AUTO: 104 FL (ref 82–98)
MONOCYTES # BLD AUTO: 0.8 K/UL (ref 0.3–1)
MONOCYTES NFR BLD: 8.4 % (ref 4–15)
NEUTROPHILS # BLD AUTO: 6.7 K/UL (ref 1.8–7.7)
NEUTROPHILS NFR BLD: 74 % (ref 38–73)
NONHDLC SERPL-MCNC: 143 MG/DL
NRBC BLD-RTO: 0 /100 WBC
PLATELET # BLD AUTO: 206 K/UL (ref 150–350)
PMV BLD AUTO: 9.7 FL (ref 9.2–12.9)
POTASSIUM SERPL-SCNC: 5.6 MMOL/L (ref 3.5–5.1)
PROT SERPL-MCNC: 8.1 G/DL (ref 6–8.4)
RBC # BLD AUTO: 4.68 M/UL (ref 4.6–6.2)
SODIUM SERPL-SCNC: 136 MMOL/L (ref 136–145)
T4 FREE SERPL-MCNC: 1 NG/DL (ref 0.71–1.51)
TRIGL SERPL-MCNC: 63 MG/DL (ref 30–150)
TSH SERPL DL<=0.005 MIU/L-ACNC: 0.51 UIU/ML (ref 0.4–4)
WBC # BLD AUTO: 9.09 K/UL (ref 3.9–12.7)

## 2020-10-05 PROCEDURE — 80074 ACUTE HEPATITIS PANEL: CPT

## 2020-10-05 PROCEDURE — 90670 PNEUMOCOCCAL CONJUGATE VACCINE 13-VALENT LESS THAN 5YO & GREATER THAN: ICD-10-PCS | Mod: S$GLB,,, | Performed by: FAMILY MEDICINE

## 2020-10-05 PROCEDURE — 99203 PR OFFICE/OUTPT VISIT, NEW, LEVL III, 30-44 MIN: ICD-10-PCS | Mod: 25,S$GLB,, | Performed by: FAMILY MEDICINE

## 2020-10-05 PROCEDURE — 84153 ASSAY OF PSA TOTAL: CPT

## 2020-10-05 PROCEDURE — 99203 OFFICE O/P NEW LOW 30 MIN: CPT | Mod: 25,S$GLB,, | Performed by: FAMILY MEDICINE

## 2020-10-05 PROCEDURE — 90694 FLU VACCINE - QUADRIVALENT - ADJUVANTED: ICD-10-PCS | Mod: S$GLB,,, | Performed by: FAMILY MEDICINE

## 2020-10-05 PROCEDURE — 1159F PR MEDICATION LIST DOCUMENTED IN MEDICAL RECORD: ICD-10-PCS | Mod: S$GLB,,, | Performed by: FAMILY MEDICINE

## 2020-10-05 PROCEDURE — 83036 HEMOGLOBIN GLYCOSYLATED A1C: CPT

## 2020-10-05 PROCEDURE — 80053 COMPREHEN METABOLIC PANEL: CPT

## 2020-10-05 PROCEDURE — 99999 PR PBB SHADOW E&M-EST. PATIENT-LVL IV: ICD-10-PCS | Mod: PBBFAC,,, | Performed by: FAMILY MEDICINE

## 2020-10-05 PROCEDURE — 80061 LIPID PANEL: CPT

## 2020-10-05 PROCEDURE — 90694 VACC AIIV4 NO PRSRV 0.5ML IM: CPT | Mod: S$GLB,,, | Performed by: FAMILY MEDICINE

## 2020-10-05 PROCEDURE — 36415 COLL VENOUS BLD VENIPUNCTURE: CPT | Mod: PO

## 2020-10-05 PROCEDURE — G0009 PNEUMOCOCCAL CONJUGATE VACCINE 13-VALENT LESS THAN 5YO & GREATER THAN: ICD-10-PCS | Mod: S$GLB,,, | Performed by: FAMILY MEDICINE

## 2020-10-05 PROCEDURE — 1101F PT FALLS ASSESS-DOCD LE1/YR: CPT | Mod: CPTII,S$GLB,, | Performed by: FAMILY MEDICINE

## 2020-10-05 PROCEDURE — 90670 PCV13 VACCINE IM: CPT | Mod: S$GLB,,, | Performed by: FAMILY MEDICINE

## 2020-10-05 PROCEDURE — G0008 ADMIN INFLUENZA VIRUS VAC: HCPCS | Mod: S$GLB,,, | Performed by: FAMILY MEDICINE

## 2020-10-05 PROCEDURE — 3008F BODY MASS INDEX DOCD: CPT | Mod: CPTII,S$GLB,, | Performed by: FAMILY MEDICINE

## 2020-10-05 PROCEDURE — 99999 PR PBB SHADOW E&M-EST. PATIENT-LVL IV: CPT | Mod: PBBFAC,,, | Performed by: FAMILY MEDICINE

## 2020-10-05 PROCEDURE — 85025 COMPLETE CBC W/AUTO DIFF WBC: CPT

## 2020-10-05 PROCEDURE — G0009 ADMIN PNEUMOCOCCAL VACCINE: HCPCS | Mod: S$GLB,,, | Performed by: FAMILY MEDICINE

## 2020-10-05 PROCEDURE — 3008F PR BODY MASS INDEX (BMI) DOCUMENTED: ICD-10-PCS | Mod: CPTII,S$GLB,, | Performed by: FAMILY MEDICINE

## 2020-10-05 PROCEDURE — 1125F AMNT PAIN NOTED PAIN PRSNT: CPT | Mod: S$GLB,,, | Performed by: FAMILY MEDICINE

## 2020-10-05 PROCEDURE — 1159F MED LIST DOCD IN RCRD: CPT | Mod: S$GLB,,, | Performed by: FAMILY MEDICINE

## 2020-10-05 PROCEDURE — 1125F PR PAIN SEVERITY QUANTIFIED, PAIN PRESENT: ICD-10-PCS | Mod: S$GLB,,, | Performed by: FAMILY MEDICINE

## 2020-10-05 PROCEDURE — 86703 HIV-1/HIV-2 1 RESULT ANTBDY: CPT

## 2020-10-05 PROCEDURE — 84443 ASSAY THYROID STIM HORMONE: CPT

## 2020-10-05 PROCEDURE — G0008 FLU VACCINE - QUADRIVALENT - ADJUVANTED: ICD-10-PCS | Mod: S$GLB,,, | Performed by: FAMILY MEDICINE

## 2020-10-05 PROCEDURE — 84439 ASSAY OF FREE THYROXINE: CPT

## 2020-10-05 PROCEDURE — 1101F PR PT FALLS ASSESS DOC 0-1 FALLS W/OUT INJ PAST YR: ICD-10-PCS | Mod: CPTII,S$GLB,, | Performed by: FAMILY MEDICINE

## 2020-10-05 RX ORDER — TIZANIDINE 2 MG/1
2 TABLET ORAL EVERY 8 HOURS PRN
Qty: 60 TABLET | Refills: 0 | Status: SHIPPED | OUTPATIENT
Start: 2020-10-05 | End: 2020-10-15

## 2020-10-05 RX ORDER — ESCITALOPRAM OXALATE 20 MG/1
20 TABLET ORAL DAILY
Qty: 90 TABLET | Refills: 3 | Status: SHIPPED | OUTPATIENT
Start: 2020-10-05 | End: 2020-12-17 | Stop reason: ALTCHOICE

## 2020-10-05 NOTE — PROGRESS NOTES
[facility]  Progress Note    SUBJECTIVE:     Chief Complaint   Patient presents with    Anxiety    Depression       HPI   Leonie Denis  is a 65 y.o. male here to establish care. Also has c/o anxiety and depression which was dx many years ago. Was on lexapro which helped but stopped taking as he lost follow-up with a doctor. Denies any SI/HI. accompanied by his female partner today. Doing well otherwise. No other known PMH. Has some lower back pain which is chronic. rates pain as moderate, non-radiating. Admits doesn't exercise. Patient has no other new complaints/problems at this time.      Review of patient's allergies indicates:  No Known Allergies    Past Medical History:   Diagnosis Date    Depression     Hypertension      History reviewed. No pertinent surgical history.  History reviewed. No pertinent family history.  Social History     Tobacco Use    Smoking status: Current Every Day Smoker   Substance Use Topics    Alcohol use: Yes    Drug use: No        Review of Systems   Constitutional: Negative for chills, fever and malaise/fatigue.   HENT: Negative.    Respiratory: Negative.  Negative for cough and shortness of breath.    Cardiovascular: Negative.  Negative for chest pain.   Gastrointestinal: Negative.  Negative for abdominal pain, nausea and vomiting.   Genitourinary: Negative.    Musculoskeletal: Positive for back pain.   Neurological: Negative for weakness and headaches.   Psychiatric/Behavioral: Positive for depression. Negative for hallucinations, substance abuse and suicidal ideas. The patient is nervous/anxious. The patient does not have insomnia.    All other systems reviewed and are negative.    OBJECTIVE:     Vitals:    10/05/20 1408   BP: 120/70   Pulse: 70   Temp: 97.6 °F (36.4 °C)     Body mass index is 26.02 kg/m².    Physical Exam   Constitutional: He is oriented to person, place, and time and well-developed, well-nourished, and in no distress. No distress.   HENT:    Head: Normocephalic and atraumatic.   Right Ear: External ear normal. Tympanic membrane is not perforated, not erythematous, not retracted and not bulging. No hemotympanum.   Left Ear: External ear normal. Tympanic membrane is not perforated, not erythematous, not retracted and not bulging. No hemotympanum.   Nose: Nose normal.   Mouth/Throat: Oropharynx is clear and moist. No oropharyngeal exudate.   Eyes: Conjunctivae and EOM are normal.   Neck: Normal range of motion. Neck supple.   Cardiovascular: Normal rate, regular rhythm and normal heart sounds. Exam reveals no gallop and no friction rub.   No murmur heard.  Pulmonary/Chest: Effort normal and breath sounds normal. No respiratory distress. He has no wheezes. He has no rales. He exhibits no tenderness.   Abdominal: Soft. Bowel sounds are normal. He exhibits no distension. There is no abdominal tenderness. There is no rebound.   Musculoskeletal:         General: Tenderness (to palpation of paraspinal muscles) present. No edema.   Neurological: He is alert and oriented to person, place, and time. No cranial nerve deficit.   Skin: Skin is warm. He is not diaphoretic.       Old records were reviewed. Labs and/or images were independently reviewed.    ASSESSMENT     1. Anxiety and depression    2. Muscle spasm of back    3. Skin lesion    4. Routine physical examination    5. Abnormal finding of blood chemistry, unspecified     6. Abnormal findings on diagnostic imaging of other specified body structures     7. Encounter for screening for malignant neoplasm of prostate     8. Tobacco abuse    9. Need for immunization against influenza    10. Pneumococcal vaccination administered at current visit    11. Encounter for screening for malignant neoplasm of colon        PLAN:     Anxiety and depression  -     Start escitalopram oxalate (LEXAPRO) 20 MG tablet; Take 1 tablet (20 mg total) by mouth once daily.  Dispense: 90 tablet; Refill: 3  -    I have discussed the  common side effects of this medication with the patient and answered all of the questions they had at the time of this visit regarding this medication.    Muscle spasm of back  -     tiZANidine (ZANAFLEX) 2 MG tablet; Take 1 tablet (2 mg total) by mouth every 8 (eight) hours as needed.  Dispense: 60 tablet; Refill: 0  -     Ambulatory referral/consult to Physical/Occupational Therapy; Future; Expected date: 10/12/2020  -     Patient counseled on good posture and stretching exercises. Continue to place ice packs on affected areas 3-4 times daily. Take medications as prescribed. Instructed patient to call MD if symptoms persist or worsen.    Skin lesion  -     Ambulatory referral/consult to Dermatology; Future; Expected date: 10/12/2020    Routine physical examination  -     CBC auto differential; Future  -     Comprehensive Metabolic Panel; Future  -     Hemoglobin A1C; Future  -     Lipid Panel; Future  -     TSH; Future  -     T4, Free; Future  -     PSA, Screening; Future; Expected date: 10/05/2020  -     Hepatitis Panel, Acute; Future; Expected date: 10/05/2020  -     HIV 1/2 Ag/Ab (4th Gen); Future; Expected date: 10/05/2020    Encounter for screening for malignant neoplasm of prostate   -     PSA, Screening; Future; Expected date: 10/05/2020    Tobacco abuse  -     Ambulatory referral/consult to Smoking Cessation Program; Future; Expected date: 10/12/2020  -     Counseled patient about importance of smoking cessation. Patient ready to quit. Will start smoking cessation treatment options.    Need for immunization against influenza  -     Influenza - Quadrivalent (Adjuvanted)    Pneumococcal vaccination administered at current visit  -     Pneumococcal Conjugate Vaccine (13 Valent) (IM)    Encounter for screening for malignant neoplasm of colon  -     Case request GI: COLONOSCOPY      RTC PRMAMADOU Frye MD  10/05/2020 2:26 PM

## 2020-10-06 LAB
COMPLEXED PSA SERPL-MCNC: 0.78 NG/ML (ref 0–4)
ESTIMATED AVG GLUCOSE: 100 MG/DL (ref 68–131)
HBA1C MFR BLD HPLC: 5.1 % (ref 4–5.6)

## 2020-10-07 LAB
HAV IGM SERPL QL IA: ABNORMAL
HBV CORE IGM SERPL QL IA: NEGATIVE
HBV SURFACE AG SERPL QL IA: NEGATIVE
HCV AB SERPL QL IA: NEGATIVE
HIV 1+2 AB+HIV1 P24 AG SERPL QL IA: NEGATIVE

## 2020-10-13 ENCOUNTER — CLINICAL SUPPORT (OUTPATIENT)
Dept: SMOKING CESSATION | Facility: CLINIC | Age: 65
End: 2020-10-13
Payer: COMMERCIAL

## 2020-10-13 DIAGNOSIS — F17.200 NICOTINE DEPENDENCE: Primary | ICD-10-CM

## 2020-10-13 DIAGNOSIS — Z12.11 COLON CANCER SCREENING: Primary | ICD-10-CM

## 2020-10-13 DIAGNOSIS — Z12.11 COLON CANCER SCREENING: ICD-10-CM

## 2020-10-13 PROCEDURE — 99404 PREV MED CNSL INDIV APPRX 60: CPT | Mod: S$GLB,,,

## 2020-10-13 PROCEDURE — 99999 PR PBB SHADOW E&M-EST. PATIENT-LVL I: ICD-10-PCS | Mod: PBBFAC,,,

## 2020-10-13 PROCEDURE — 99404 PR PREVENT COUNSEL,INDIV,60 MIN: ICD-10-PCS | Mod: S$GLB,,,

## 2020-10-13 PROCEDURE — 99999 PR PBB SHADOW E&M-EST. PATIENT-LVL I: CPT | Mod: PBBFAC,,,

## 2020-10-13 RX ORDER — MICONAZOLE NITRATE 2 %
2 CREAM (GRAM) TOPICAL
Qty: 100 EACH | Refills: 0 | Status: SHIPPED | OUTPATIENT
Start: 2020-10-13 | End: 2020-10-20 | Stop reason: SDUPTHER

## 2020-10-13 RX ORDER — IBUPROFEN 200 MG
1 TABLET ORAL DAILY
Qty: 14 PATCH | Refills: 0 | Status: SHIPPED | OUTPATIENT
Start: 2020-10-13 | End: 2020-11-13

## 2020-10-13 NOTE — Clinical Note
Pt presents for intake smoking 15-18 cigs per day, after assessment and discussion recommend the 21mg nicotine patch and the 2mg nicotine gum for thoughts and urges to smoke, recommend an abrupt quit, intake handout discussed. Discussed how to wear the nicotine patch and how to use the nicotine gum, discussed s/e and strategies. Will follow

## 2020-10-15 DIAGNOSIS — Z12.11 COLON CANCER SCREENING: ICD-10-CM

## 2020-10-15 DIAGNOSIS — Z12.11 COLON CANCER SCREENING: Primary | ICD-10-CM

## 2020-10-15 RX ORDER — SODIUM, POTASSIUM,MAG SULFATES 17.5-3.13G
1 SOLUTION, RECONSTITUTED, ORAL ORAL DAILY
Qty: 1 KIT | Refills: 0 | Status: SHIPPED | OUTPATIENT
Start: 2020-12-20 | End: 2020-12-22

## 2020-10-20 ENCOUNTER — CLINICAL SUPPORT (OUTPATIENT)
Dept: SMOKING CESSATION | Facility: CLINIC | Age: 65
End: 2020-10-20
Payer: COMMERCIAL

## 2020-10-20 DIAGNOSIS — F17.200 NICOTINE DEPENDENCE: Primary | ICD-10-CM

## 2020-10-20 PROCEDURE — 99406 BEHAV CHNG SMOKING 3-10 MIN: CPT | Mod: S$GLB,,,

## 2020-10-20 PROCEDURE — 99406 PR TOBACCO USE CESSATION INTERMEDIATE 3-10 MINUTES: ICD-10-PCS | Mod: S$GLB,,,

## 2020-10-20 RX ORDER — MICONAZOLE NITRATE 2 %
2 CREAM (GRAM) TOPICAL
Qty: 100 EACH | Refills: 0 | Status: SHIPPED | OUTPATIENT
Start: 2020-10-20 | End: 2020-11-20

## 2020-11-06 ENCOUNTER — NURSE TRIAGE (OUTPATIENT)
Dept: ADMINISTRATIVE | Facility: CLINIC | Age: 65
End: 2020-11-06

## 2020-11-06 ENCOUNTER — TELEPHONE (OUTPATIENT)
Dept: FAMILY MEDICINE | Facility: CLINIC | Age: 65
End: 2020-11-06

## 2020-11-06 DIAGNOSIS — M62.830 MUSCLE SPASM OF BACK: Primary | ICD-10-CM

## 2020-11-06 RX ORDER — TIZANIDINE 2 MG/1
2 TABLET ORAL EVERY 8 HOURS PRN
Qty: 30 TABLET | Refills: 0 | Status: SHIPPED | OUTPATIENT
Start: 2020-11-06 | End: 2020-11-26

## 2020-11-06 NOTE — TELEPHONE ENCOUNTER
"Pt's wife is calling to report that the patient has a BP of 85/67 with HR of 93. Pt does not have history of blood pressure issues, nor does he take BP medications. Wife reports that patient takes a multivitamin and Zanaflex for his back.  Wife reports that patient has been complaining of being cold and wearing his jacket in the house, which prompted her to take his BP. She also took temperature which was 99.4 She reports that they just got back from the patient's brother's  in [place]. Pt denies feeling dizzy or weak. Denies other symptoms. Per protocol patient should be seen by physician today. Attempted to make appointment at [facility], but they had no openings. Patient's wife advised to bring him to Urgent Care for evaluation.    Reason for Disposition   Systolic BP < 90 and NOT dizzy, lightheaded or weak    Additional Information   Negative: Systolic BP < 90 and feeling dizzy, lightheaded, or weak   Negative: Started suddenly after an allergic medicine, an allergic food, or bee sting   Negative: Shock suspected (e.g., cold/pale/clammy skin, too weak to stand, low BP, rapid pulse)   Negative: Difficult to awaken or acting confused  (e.g., disoriented, slurred speech)   Negative: Fainted   Negative: Chest pain   Negative: Bleeding (e.g., vomiting blood, rectal bleeding or tarry stools, severe vaginal bleeding)   Negative: Extra heart beats or heart is beating fast  (i.e., "palpitations")   Negative: Sounds like a life-threatening emergency to the triager   Negative: Systolic BP < 80 and NOT dizzy, lightheaded or weak (feels normal)   Negative: Abdominal pain   Negative: Major surgery in the past month   Negative: Fever > 100.4 F (38.0 C)   Negative: Drinking very little and has signs of dehydration (e.g., no urine > 12 hours, very dry mouth, very lightheaded)   Negative: Fall in systolic BP > 20 mm Hg from normal and feeling dizzy, lightheaded, or weak   Negative: Patient sounds very " sick or weak to the triager    Protocols used: LOW BLOOD PRESSURE-A-OH

## 2020-11-10 ENCOUNTER — CLINICAL SUPPORT (OUTPATIENT)
Dept: REHABILITATION | Facility: HOSPITAL | Age: 65
End: 2020-11-10
Attending: FAMILY MEDICINE
Payer: MEDICARE

## 2020-11-10 DIAGNOSIS — R29.898 WEAKNESS OF BOTH LOWER EXTREMITIES: ICD-10-CM

## 2020-11-10 DIAGNOSIS — R26.89 ANTALGIC GAIT: ICD-10-CM

## 2020-11-10 DIAGNOSIS — M62.830 MUSCLE SPASM OF BACK: ICD-10-CM

## 2020-11-10 DIAGNOSIS — M62.81 WEAKNESS OF TRUNK MUSCULATURE: ICD-10-CM

## 2020-11-10 DIAGNOSIS — M53.86 DECREASED RANGE OF MOTION OF LUMBAR SPINE: ICD-10-CM

## 2020-11-10 PROCEDURE — 97161 PT EVAL LOW COMPLEX 20 MIN: CPT | Mod: PN

## 2020-11-10 PROCEDURE — 97110 THERAPEUTIC EXERCISES: CPT | Mod: PN

## 2020-11-10 NOTE — PLAN OF CARE
[facility]ER[facility]ALU[facility]     Name: Leonie Denis  Clinic Number: [id number]    Therapy Diagnosis:   Encounter Diagnoses   Name Primary?    Muscle spasm of back     Weakness of trunk musculature     Decreased range of motion of lumbar spine     Weakness of both lower extremities     Antalgic gait      Physician: Khanh Frye MD    Physician Orders: PT Eval and Treat   Medical Diagnosis from Referral: M62.830 (ICD-10-CM) - Muscle spasm of back  Evaluation Date: 11/10/2020  Authorization Period Expiration: 12/10/2020  Plan of Care Expiration: 2/10/2020  Reassessment Due: 12/10/2020  Visit # / Visits authorized: 1/ 1 (pending)      Time In: 745  Time Out: 830  Total Billable Time: 45 minutes    Precautions: Standard      Subjective   Date of onset: 5 years  History of current condition - Raulito reports: has had back pain for over 5 years that has progressively gotten worse. After any mobility he has to pause to rest and let his back relax. If he walks for more than 15 minutes he needs to sit down due to fatigue and pain. Reports decreased endurance and early onset of fatigue with any activity. He is able to sleep through the night on his side, but he sometimes rolls over onto his back in the middle of the night and the pain will wake him up or he will feel more soreness the next morning.     Medical History:   Past Medical History:   Diagnosis Date    Depression     Hypertension        Surgical History:   Leonie Denis  has no past surgical history on file.    Medications:   Leonie has a current medication list which includes the following prescription(s): escitalopram oxalate, famotidine, loratadine, nicotine, nicotine (polacrilex), suprep bowel prep kit, and tizanidine.    Allergies:   Review of patient's allergies indicates:  No Known Allergies     Imaging: None    Prior Therapy: Yes  Prior Treatment: Monthly steroid shots for a year 4 years ago  Social History: lives with their  spouse  Occupation: Disabled/retired/former   Leisure: Walk, fishing      Prior Level of Function: no functional limitations  Current Level of Function: pain with lifting and carrying, decreased tolerance to mobility, decreased endurance  DME owned/used: None        Pain:  Current 3/10, worst 9/10, best 3/10   Location: bilateral back   Description: Aching and Sharp  Aggravating Factors: Standing, Bending, Walking, Morning, Flexing, Lifting and Getting out of bed/chair  Easing Factors: relaxation, pain medication and rest  Disturbed Sleep: Sometimes      Pts goals: fell better and get his back well      Red Flag Screening:   Cough  Sneeze  Strain: (--)  Bladder/ bowel: (--)  Falls: (--)  Night pain: (--)  Unexplained weight loss: (--)  General health: Good    OBJECTIVE     Postural examination/scapula alignment: Rounded shoulder, Head forward, Slouched posture, Increased kyphosis and Decreased lordosis  Joint integrity: Firm end feel and hypomobility with Lumbar   Skin integrity:Intact  Edema: None  Sitting: Rounded shoulder, Head forward, Slouched posture, Increased kyphosis and Decreased lordosis (more slouched in sitting than standing)  Standing: Rounded shoulder, Head forward, Slouched posture, Increased kyphosis and Decreased lordosis  Correction of posture: better with lumbar roll    MOVEMENT LOSS    ROM Loss   Flexion minimal loss   Extension moderate loss   Side bending Right minimal loss   Side bending Left minimal loss   Rotation Right minimal loss   Rotation Left minimal loss     Lower Extremity Strength  Right LE  Left LE    Hip flexion: 4+/5 Hip flexion: 4/5   Hip extension:  4-/5 Hip extension: 4/5   Hip abduction: 4/5 Hip abduction: 4/5   Hip adduction:  4/5 Hip adduction:  4/5   Hip Internal rotation   4+/5 Hip Internal rotation 4+/5   Knee Flexion 5/5 Knee Flexion 5/5   Knee Extension 5/5 Knee Extension 5/5   Ankle dorsiflexion: 5/5 Ankle dorsiflexion: 5/5   Ankle plantarflexion:  "5/5 Ankle plantarflexion: 5/5       GAIT:  Assistive Device used: none  Level of Assistance: independent  Patient displays the following gait deviations:  unsteady gait.     Special Tests:   Test Name  Test Result   Prone Instability Test (+)   SI Joint Provocation Test (+)   Straight Leg Raise (--)   Neural Tension Test (--)   Crossed Straight Leg Raise (--)   Walking on toes (--)   Walking on heels  (--)       NEUROLOGICAL SCREENING     Sensory deficit: Intact to light touch throughout    REPEATED TEST MOVEMENTS:  Repeated Flexion in Standing worse   Repeated Extension in Standing better   Repeated Flexion in lying no effect   Repeated Extension in lying  better       STATIC TESTS   Sitting slouched  worse   Sitting erect better   Standing slouched worse   Standing erect  better   Lying prone in extension  better   Long sitting   worse         Limitation/Restriction for FOTO Survey    Therapist reviewed FOTO scores for Leonie Denis on 11/10/2020.   FOTO documents entered into [employer] - see Media section.    Limitation Score:     (patient left without completing FOTO, administer next visit)             Treatment   Treatment Time In: 815  Treatment Time Out: 830  Total Treatment time separate from Evaluation: 15 minutes      Leonie received therapeutic exercises to develop/improve posture, lumbar/cervical ROM, strength and muscular endurance for 15 minutes including the following exercises:     Posterior pelvic tilts 10x5"  Bridging x10  Clamshells RTB x10  Seated hamstring stretch x30"  Prone press ups x10      Written Home Exercises Provided: yes.  Exercises were reviewed and Raulito was able to demonstrate them prior to the end of the session.  Raulito demonstrated good  understanding of the education provided.     See EMR under Patient Instructions for exercises provided 11/10/2020.      Education provided:   - Patient received education regarding proper posture and body mechanics.    - Amber arboleda tried, recommended, " and purchase information was provided.  - HEP  - POC      Assessment   Leonie is a 65 y.o. male referred to [facility] with a medical diagnosis of muscle spasm of back. Pt presents with signs and symptoms consistent with diagnosis including increased tone and tightness and soreness in bilateral lumbar paraspinals, decreased mobility with lumbar , weakness of lower extremities, decreased ROM of lumbar spine, decreased soft tissue mobility and flexibility. These deficits are limiting him in independent ADL's and leisure activities such as walking, regular exercise, climbing a ladder, getting in and out of bed or a chair, driving, and sleeping. Patient displays extension movement preference, but also reports that bilateral sidebending and rotation also feels good in his back. Only painful motion is flexion. Patient was educated on healthy back program and was receptive to beginning program     Pt prognosis is Good.   Pt will benefit from skilled outpatient Physical Therapy to address the deficits stated above and in the chart below, provide pt/family education, and to maximize pt's level of independence. Based on the above history and physical examination an active physical therapy program is recommended.  Pt will continue to benefit from skilled outpatient physical therapy to address the deficits listed below in the chart, provide pt/family education and to maximize pt's level of independence in the home and community environment. .       Plan of care discussed with patient: Yes  Pt's spiritual, cultural and educational needs considered and patient is agreeable to the plan of care and goals as stated below:     Anticipated Barriers for therapy: None    PT Evaluation Completed? Yes    Medical necessity is demonstrated by the following problem list.    Pt presents with the following impairments:     History  Co-morbidities and personal factors that may impact the plan of care Co-morbidities:   advanced age,  anxiety and depression    Personal Factors:   age     low   Examination  Body Structures and Functions, activity limitations and participation restrictions that may impact the plan of care Body Regions:   back  lower extremities  trunk    Body Systems:    ROM  strength  gait    Participation Restrictions:   None    Activity limitations:   Learning and applying knowledge  no deficits    General Tasks and Commands  no deficits    Communication  no deficits    Mobility  lifting and carrying objects  walking  driving (bike, car, motorcycle)    Self care  no deficits    Domestic Life  no deficits    Interactions/Relationships  no deficits    Life Areas  no deficits    Community and Social Life  no deficits         low   Clinical Presentation stable and uncomplicated low   Decision Making/ Complexity Score: low       GOALS: Short Term Goals:  4 weeks  1. Report decreased in pain at worse less than  <   / =  2  /10  to increase tolerance for functional activities.On going  2. Pt to increase B popliteal angle by greater than > or = 5 degrees in order to improve flexibility and posture. On going  3. Increased LE MMT 1/2  to increase tolerance for ADL and work activities.On going  4. Pt to increase B Ely's Test by greater than  > or = 5degrees in order to improve flexibility and posture. On going  5. Pt to tolerate HEP to improve ROM and independence with ADL's.On going  6. Pt to improve range of motion by 25% to allow for improved functional mobility to allow for improvement in IADLs. On going    Long Term Goals: 8 weeks  1. Report decreased in pain at worse less than  <   / =  1  /10  to increase tolerance for functional mobility.   2 .Pt to increase B popliteal angle by greater than > or = 10 degrees in order to improve flexibility and posture.   3. Increased LE MMT 1 grade to increase tolerance for ADL and work activities.  5. Pt to be Independent with HEP to improve ROM and independence with ADL's  6. Pt to increase B  Ely's Test by greater than > or = 10 degrees in order to improve flexibility and posture.   7. Pt to demonstrate negative Bridge Test in order to show improved core strength for lumbar stabilization.   8. Pt to improve range of motion by 75% to allow for improved functional mobility to allow for improvement in IADLs.       Plan   Plan of care Certification: 11/10/2020 to 2/10/2020.    Outpatient Physical Therapy 2 times weekly for 10 weeks or 20 visits to include the following interventions: Cervical/Lumbar Traction, Manual Therapy, Moist Heat/ Ice, Neuromuscular Re-ed, Patient Education, Therapeutic Activites and Therapeutic Exercise, dry needling     Obi Herrera PT

## 2020-11-23 ENCOUNTER — CLINICAL SUPPORT (OUTPATIENT)
Dept: REHABILITATION | Facility: HOSPITAL | Age: 65
End: 2020-11-23
Attending: FAMILY MEDICINE
Payer: MEDICARE

## 2020-11-23 DIAGNOSIS — R26.89 ANTALGIC GAIT: ICD-10-CM

## 2020-11-23 DIAGNOSIS — M53.86 DECREASED RANGE OF MOTION OF LUMBAR SPINE: ICD-10-CM

## 2020-11-23 DIAGNOSIS — R29.898 WEAKNESS OF BOTH LOWER EXTREMITIES: ICD-10-CM

## 2020-11-23 DIAGNOSIS — M62.81 WEAKNESS OF TRUNK MUSCULATURE: ICD-10-CM

## 2020-11-23 PROCEDURE — 97110 THERAPEUTIC EXERCISES: CPT | Mod: PN

## 2020-11-23 NOTE — PROGRESS NOTES
"  Physical Therapy Treatment Note     Name: Leonie Denis  [facility] Number: [id number]    Therapy Diagnosis:   Encounter Diagnoses   Name Primary?    Weakness of trunk musculature     Decreased range of motion of lumbar spine     Weakness of both lower extremities     Antalgic gait      Physician: Khanh Frye MD    Visit Date: 11/23/2020    Physician Orders: PT Eval and Treat   Medical Diagnosis from Referral: M62.830 (ICD-10-CM) - Muscle spasm of back  Evaluation Date: 11/10/2020  Authorization Period Expiration: 12/10/2020  Plan of Care Expiration: 2/10/2020  Reassessment Due: 12/10/2020  Visit # / Visits authorized: 1/ pending      Time In: 1330   Time Out: 1415  Total Billable Time: 45 minutes    Precautions: Standard    Subjective     Pt reports: no significant changes in symptoms since evaluation. Has not used HEP .  He was not compliant with home exercise program.  Response to previous treatment: Good  Functional change: Ongoing     Pain: 5/10  Location: bilateral back      Objective     Raulito received therapeutic exercises to develop strength, endurance, ROM, flexibility, posture and core stabilization for 45 minutes including:    Nu-step x7'   Standing hamstring stretch 3x20" ea  LTR 10x5" ea   Posterior pelvic tilts 10x5"  PPT + BKFO x10 ea  Bridging x15  HL Adduction Squeeze x15x5"   SL Clamshells RTB x15 ea   Prone press ups x10  Air squats to 18" plyo step + foam  x15     NV: Supine QL stretch 5x10" ea , EIS    Raulito received the following manual therapy techniques:   were applied to the:  for 00 minutes, including:     Raulito participated in dynamic functional therapeutic activities to improve functional performance for 00  minutes, including:    Raulito received hot pack for 00 minutes to.    Raulito received cold pack for 00 minutes to low back.      Home Exercises Provided and Patient Education Provided     Education provided:   - Rationale for POC and progression   - Continued use of HEP    Written " Home Exercises Provided: yes.  Exercises were reviewed and Raulito was able to demonstrate them prior to the end of the session.  Raulito demonstrated fair  understanding of the education provided.     See EMR under Patient Instructions for exercises provided 11/23/2020.    Assessment     Raulito was seen today for first follow up since evaluation. Because insurance approval still pending, Healthy Back program not initiated this session. PT to consider beginning next visit. He performed well, tolerating addition of new exercises without exacerbation of symptoms. Slight stiffness noted in back today; therefore session began with increased stretching to low back and B LE. HEP updated to include LTR. Continued use of extension-based exercises such as bridges, sit <>stands, prone prone extension and extension in standing. Significant instability noted with bridging, indicating glute and core weakness. TrA weakness also supported by fasciculations during knee fall outs. Improvement in symptoms noted following completion of session.      Raulito is progressing well towards his goals.   Pt prognosis is Good.     Pt will continue to benefit from skilled outpatient physical therapy to address the deficits listed in the problem list box on initial evaluation, provide pt/family education and to maximize pt's level of independence in the home and community environment.     Pt's spiritual, cultural and educational needs considered and pt agreeable to plan of care and goals.     Anticipated barriers to physical therapy: None     Goals:   Short Term Goals:  4 weeks  1. Report decreased in pain at worse less than  <   / =  2  /10  to increase tolerance for functional activities.On going   2. Pt to increase B popliteal angle by greater than > or = 5 degrees in order to improve flexibility and posture. On going  3. Increased LE MMT 1/2  to increase tolerance for ADL and work activities.On going  4. Pt to increase B Ely's Test by greater than   > or = 5degrees in order to improve flexibility and posture. On going  5. Pt to tolerate HEP to improve ROM and independence with ADL's.On going  6. Pt to improve range of motion by 25% to allow for improved functional mobility to allow for improvement in IADLs. On going     Long Term Goals: 8 weeks  1. Report decreased in pain at worse less than  <   / =  1  /10  to increase tolerance for functional mobility.   2 .Pt to increase B popliteal angle by greater than > or = 10 degrees in order to improve flexibility and posture.   3. Increased LE MMT 1 grade to increase tolerance for ADL and work activities.  5. Pt to be Independent with HEP to improve ROM and independence with ADL's  6. Pt to increase B Ely's Test by greater than > or = 10 degrees in order to improve flexibility and posture.   7. Pt to demonstrate negative Bridge Test in order to show improved core strength for lumbar stabilization.   8. Pt to improve range of motion by 75% to allow for improved functional mobility to allow for improvement in IADLs.     Plan     Cont with POC, progressing with trunk, core and LE flexibility and strengthening.     FABIANA LANDAVERDE, PT   11/23/2020

## 2020-11-30 ENCOUNTER — CLINICAL SUPPORT (OUTPATIENT)
Dept: REHABILITATION | Facility: HOSPITAL | Age: 65
End: 2020-11-30
Attending: FAMILY MEDICINE
Payer: MEDICARE

## 2020-11-30 DIAGNOSIS — M53.86 DECREASED RANGE OF MOTION OF LUMBAR SPINE: ICD-10-CM

## 2020-11-30 DIAGNOSIS — M62.81 WEAKNESS OF TRUNK MUSCULATURE: Primary | ICD-10-CM

## 2020-11-30 DIAGNOSIS — R29.898 WEAKNESS OF LOWER EXTREMITY, UNSPECIFIED LATERALITY: ICD-10-CM

## 2020-11-30 DIAGNOSIS — R26.89 ANTALGIC GAIT: ICD-10-CM

## 2020-11-30 PROCEDURE — 97110 THERAPEUTIC EXERCISES: CPT | Mod: PN

## 2020-11-30 NOTE — PROGRESS NOTES
"  Physical Therapy Treatment Note     Name: Leonie Denis  [facility] Number: [id number]    Therapy Diagnosis:   Encounter Diagnoses   Name Primary?    Weakness of trunk musculature Yes    Decreased range of motion of lumbar spine     Weakness of lower extremity, unspecified laterality     Antalgic gait      Physician: Khanh Frye MD    Visit Date: 11/30/2020    Physician Orders: PT Eval and Treat   Medical Diagnosis from Referral: M62.830 (ICD-10-CM) - Muscle spasm of back  Evaluation Date: 11/10/2020  Authorization Period Expiration: 12/10/2020  Plan of Care Expiration: 2/10/2020  Reassessment Due: 12/10/2020  Visit # / Visits authorized: 2/ 20      Time In: 1550  Time Out: 1630  Total Billable Time: 40 minutes    Precautions: Standard    Subjective     Pt reports: He felt good after he left the other day. States his pain is about 4/10 today.     He was not compliant with home exercise program.  Response to previous treatment: Good  Functional change: Ongoing     Pain: 4/10  Location: bilateral back      Objective     Raulito received therapeutic exercises to develop strength, endurance, ROM, flexibility, posture and core stabilization for 40 minutes including:    Nu-step x7'   Standing hamstring stretch 3x20" ea  LTR 10x5" ea   Posterior pelvic tilts 15x5"  PPT + BKFO x10 ea  Bridging  2x10  HL Adduction Squeeze x15x5"   SL Clamshells RTB x15 ea   Prone press ups x10  Air squats to 18" plyo step + foam  x15     NV: Supine QL stretch 5x10" ea , EIS    Raulito received the following manual therapy techniques:   were applied to the:  for 00 minutes, including:     Raulito participated in dynamic functional therapeutic activities to improve functional performance for 00  minutes, including:    Raulito received hot pack for 00 minutes to.    aRulito received cold pack for 00 minutes to low back.      Home Exercises Provided and Patient Education Provided     Education provided:   - Rationale for POC and progression   - " Continued use of HEP    Written Home Exercises Provided: yes.  Exercises were reviewed and Raulito was able to demonstrate them prior to the end of the session.  Raulito demonstrated fair  understanding of the education provided.     See EMR under Patient Instructions for exercises provided 11/23/2020.    Assessment     Raulito was seen today for first follow up since evaluation. Pt with good tolerance to all prescribed there-ex, no significant reproduction of pain throughout session. Pt requiring vc for TrA during bridging indicating core weakness. Pt also with difficulty maintaining TrA during BKFO. Pt with improved symptoms reported following session and overall improvement in symptoms reported with movement. Pt may benefit from transition into healthy back program as suggested by evaluating therapist.     Raulito is progressing well towards his goals.   Pt prognosis is Good.     Pt will continue to benefit from skilled outpatient physical therapy to address the deficits listed in the problem list box on initial evaluation, provide pt/family education and to maximize pt's level of independence in the home and community environment.     Pt's spiritual, cultural and educational needs considered and pt agreeable to plan of care and goals.     Anticipated barriers to physical therapy: None     Goals:   Short Term Goals:  4 weeks  1. Report decreased in pain at worse less than  <   / =  2  /10  to increase tolerance for functional activities.On going   2. Pt to increase B popliteal angle by greater than > or = 5 degrees in order to improve flexibility and posture. On going  3. Increased LE MMT 1/2  to increase tolerance for ADL and work activities.On going  4. Pt to increase B Ely's Test by greater than  > or = 5degrees in order to improve flexibility and posture. On going  5. Pt to tolerate HEP to improve ROM and independence with ADL's.On going  6. Pt to improve range of motion by 25% to allow for improved functional  mobility to allow for improvement in IADLs. On going     Long Term Goals: 8 weeks  1. Report decreased in pain at worse less than  <   / =  1  /10  to increase tolerance for functional mobility.   2 .Pt to increase B popliteal angle by greater than > or = 10 degrees in order to improve flexibility and posture.   3. Increased LE MMT 1 grade to increase tolerance for ADL and work activities.  5. Pt to be Independent with HEP to improve ROM and independence with ADL's  6. Pt to increase B Ely's Test by greater than > or = 10 degrees in order to improve flexibility and posture.   7. Pt to demonstrate negative Bridge Test in order to show improved core strength for lumbar stabilization.   8. Pt to improve range of motion by 75% to allow for improved functional mobility to allow for improvement in IADLs.     Plan     Cont with POC, progressing with trunk, core and LE flexibility and strengthening.     Katy Wu, PT   11/30/2020

## 2020-12-03 ENCOUNTER — DOCUMENTATION ONLY (OUTPATIENT)
Dept: REHABILITATION | Facility: HOSPITAL | Age: 65
End: 2020-12-03

## 2020-12-03 NOTE — PROGRESS NOTES
Physical Therapy    Patient did not show up for today's therapy session. Next scheduled appointment 12/7/2020. PT called to confirm appointment.     Cancel: 1    No Show: 2      Tawanna Watson, BRO, DPT  12/2/2020

## 2020-12-07 ENCOUNTER — CLINICAL SUPPORT (OUTPATIENT)
Dept: REHABILITATION | Facility: HOSPITAL | Age: 65
End: 2020-12-07
Attending: FAMILY MEDICINE
Payer: MEDICARE

## 2020-12-07 DIAGNOSIS — M62.81 WEAKNESS OF TRUNK MUSCULATURE: ICD-10-CM

## 2020-12-07 DIAGNOSIS — M53.86 DECREASED RANGE OF MOTION OF LUMBAR SPINE: ICD-10-CM

## 2020-12-07 DIAGNOSIS — R26.89 ANTALGIC GAIT: ICD-10-CM

## 2020-12-07 DIAGNOSIS — R29.898 WEAKNESS OF BOTH LOWER EXTREMITIES: ICD-10-CM

## 2020-12-07 PROCEDURE — 97110 THERAPEUTIC EXERCISES: CPT | Mod: PN

## 2020-12-07 NOTE — PROGRESS NOTES
"  Physical Therapy Treatment Note     Name: Leonie Denis  [facility] Number: [id number]    Therapy Diagnosis:   Encounter Diagnoses   Name Primary?    Weakness of trunk musculature     Decreased range of motion of lumbar spine     Weakness of both lower extremities     Antalgic gait      Physician: Khanh Frye MD    Visit Date: 12/7/2020    Physician Orders: PT Eval and Treat   Medical Diagnosis from Referral: M62.830 (ICD-10-CM) - Muscle spasm of back  Evaluation Date: 11/10/2020  Authorization Period Expiration: 12/10/2020  Plan of Care Expiration: 2/10/2020  Reassessment Due: 12/10/2020  Visit # / Visits authorized: 3/ 20      Time In: 12:45 pm  Time Out: 1:30 pm  Total Billable Time: 45 minutes    Precautions: Standard    Subjective     Pt reports: that he felt good after last PT session.     He was not compliant with home exercise program.  Response to previous treatment: Good  Functional change: Ongoing     Pain: 4/10  Location: bilateral back      Objective     Raulito received therapeutic exercises to develop strength, endurance, ROM, flexibility, posture and core stabilization for 40 minutes including:    Nu-step x7'   Standing hamstring stretch 3x20" ea  LTR 10x5" ea   Posterior pelvic tilts 15x5"  PPT + BKFO x10 ea  Bridging  2x10  HL Adduction Squeeze x15x5"   SL Clamshells RTB x15 ea   Prone press ups x10  Air squats to 18" plyo step + foam  x15    Supine QL stretch 5x10" ea ,   EIS 2x10     Raulito received the following manual therapy techniques:   were applied to the:  for 00 minutes, including:     Raulito participated in dynamic functional therapeutic activities to improve functional performance for 00  minutes, including:    Raulito received hot pack for 00 minutes to.    Raulito received cold pack for 00 minutes to low back.      Home Exercises Provided and Patient Education Provided     Education provided:   - Rationale for POC and progression   - Continued use of HEP    Written Home Exercises " Provided: yes.  Exercises were reviewed and Raulito was able to demonstrate them prior to the end of the session.  Raulito demonstrated fair  understanding of the education provided.     See EMR under Patient Instructions for exercises provided 11/23/2020.    Assessment     Raulito tolerated well PT session today. No provocation of lumbar pain during therapy. Pt tolerated well progression of lumbar mobility and stability. Heavy v/c's required to perform all exercises due to poor form and muscles activation. Plan to progress exercises next visit. Pt may benefit from transition into healthy back program as suggested by evaluating therapist.     Raulito is progressing well towards his goals.   Pt prognosis is Good.     Pt will continue to benefit from skilled outpatient physical therapy to address the deficits listed in the problem list box on initial evaluation, provide pt/family education and to maximize pt's level of independence in the home and community environment.     Pt's spiritual, cultural and educational needs considered and pt agreeable to plan of care and goals.     Anticipated barriers to physical therapy: None     Goals:   Short Term Goals:  4 weeks  1. Report decreased in pain at worse less than  <   / =  2  /10  to increase tolerance for functional activities.On going   2. Pt to increase B popliteal angle by greater than > or = 5 degrees in order to improve flexibility and posture. On going  3. Increased LE MMT 1/2  to increase tolerance for ADL and work activities.On going  4. Pt to increase B Ely's Test by greater than  > or = 5degrees in order to improve flexibility and posture. On going  5. Pt to tolerate HEP to improve ROM and independence with ADL's.On going  6. Pt to improve range of motion by 25% to allow for improved functional mobility to allow for improvement in IADLs. On going     Long Term Goals: 8 weeks  1. Report decreased in pain at worse less than  <   / =  1  /10  to increase tolerance for  functional mobility.   2 .Pt to increase B popliteal angle by greater than > or = 10 degrees in order to improve flexibility and posture.   3. Increased LE MMT 1 grade to increase tolerance for ADL and work activities.  5. Pt to be Independent with HEP to improve ROM and independence with ADL's  6. Pt to increase B Ely's Test by greater than > or = 10 degrees in order to improve flexibility and posture.   7. Pt to demonstrate negative Bridge Test in order to show improved core strength for lumbar stabilization.   8. Pt to improve range of motion by 75% to allow for improved functional mobility to allow for improvement in IADLs.     Plan     Cont with POC, progressing with trunk, core and LE flexibility and strengthening.     Jacob Jack, PT   12/7/2020

## 2020-12-14 ENCOUNTER — TELEPHONE (OUTPATIENT)
Dept: FAMILY MEDICINE | Facility: CLINIC | Age: 65
End: 2020-12-14

## 2020-12-14 ENCOUNTER — CLINICAL SUPPORT (OUTPATIENT)
Dept: REHABILITATION | Facility: HOSPITAL | Age: 65
End: 2020-12-14
Attending: FAMILY MEDICINE
Payer: MEDICARE

## 2020-12-14 DIAGNOSIS — M53.86 DECREASED RANGE OF MOTION OF LUMBAR SPINE: ICD-10-CM

## 2020-12-14 DIAGNOSIS — R29.898 WEAKNESS OF LOWER EXTREMITY, UNSPECIFIED LATERALITY: ICD-10-CM

## 2020-12-14 DIAGNOSIS — R26.89 ANTALGIC GAIT: ICD-10-CM

## 2020-12-14 DIAGNOSIS — M62.81 WEAKNESS OF TRUNK MUSCULATURE: ICD-10-CM

## 2020-12-14 PROCEDURE — 97110 THERAPEUTIC EXERCISES: CPT | Mod: PN

## 2020-12-14 NOTE — TELEPHONE ENCOUNTER
Spoke with pt's wife she states pt brother passed away and pt has been very depressed.She states she has been doubling pt's medication and he is now out. She is requesting something stronger be prescribed states medication isn't working.He has visit scheduled with NP on tomorrow.

## 2020-12-14 NOTE — PROGRESS NOTES
Physical Therapy Treatment Note     Name: Leonie Denis  [facility] Number: [id number]    Therapy Diagnosis:   Encounter Diagnoses   Name Primary?    Weakness of trunk musculature     Decreased range of motion of lumbar spine     Weakness of lower extremity, unspecified laterality     Antalgic gait      Physician: Khanh Frye MD    Visit Date: 12/14/2020    Physician Orders: PT Eval and Treat   Medical Diagnosis from Referral: M62.830 (ICD-10-CM) - Muscle spasm of back  Evaluation Date: 11/10/2020  Authorization Period Expiration: 12/10/2020  Plan of Care Expiration: 2/10/2020  Visit # / Visits authorized:4/ 20     PN  Due : 1-    Time In: 12:45 pm  Time Out: 1:30 pm  Total Billable Time: 45 minutes    Precautions: Standard    Subjective     Pt reports: that he feel that he has improved about 20% since he started therapy. Pt cont feels B lower back pain     He was not compliant with home exercise program.  Response to previous treatment: Good  Functional change: Ongoing     Pain: 4/10  Location: bilateral back      Objective     CMS Impairment/Limitation/Restriction for FOTO Lumbar Spine Survey  Status Limitation G-Code CMS Severity Modifier  Intake 53% 47%  Predicted 65% 35% Goal Status+ CJ - At least 20 percent but less than 40 percent  12/14/2020 60% 40% Current Status CK - At least 40 percent but less than 60 percent  D/C Status CK **only report if this is discharge survey    MOVEMENT LOSS     ROM Loss   Flexion minimal loss with pain   Extension Min  Loss with pain    Side bending Right minimal loss   Side bending Left minimal loss   Rotation Right minimal loss   Rotation Left minimal loss      Lower Extremity Strength  Right LE   Left LE     Hip flexion: 4+/5 Hip flexion: 4/5   Hip extension:  4-/5 Hip extension: 4/5   Hip abduction: 4/5 Hip abduction: 4/5   Hip adduction:  4/5 Hip adduction:  4/5   Hip Internal rotation    4+/5 Hip Internal rotation 4+/5   Knee Flexion 5/5 Knee Flexion 5/5   Knee  "Extension 5/5 Knee Extension 5/5   Ankle dorsiflexion: 5/5 Ankle dorsiflexion: 5/5   Ankle plantarflexion: 5/5 Ankle plantarflexion: 5/5      Hamstring flexibility:  R lacking 20 deg: and L: lacking 20 deg   Quad flexibility: R:   Negative    And L: negative     Raulito received therapeutic exercises to develop strength, endurance, ROM, flexibility, posture and core stabilization for 40 minutes including:    Nu-step x7'   Standing hamstring stretch 3x20" ea  LTR 10x5" ea   Posterior pelvic tilts 15x5"  PPT + BKFO x10 ea  Bridging w/ ball squeezw 2x10  Matrix lumbar extension   Matrix toroso rotation       NP due to timeL  SL Clamshells RTB x15 ea   Prone press ups x10  Air squats to 18" plyo step + foam  x15    Supine QL stretch 5x10" ea ,   EIS 2x10     Raulito received the following manual therapy techniques:   were applied to the:  for 00 minutes, including:     Raulito participated in dynamic functional therapeutic activities to improve functional performance for 00  minutes, including:    Raulito received hot pack for 00 minutes to.    Raulito received cold pack for 00 minutes to low back.      Home Exercises Provided and Patient Education Provided     Education provided:   - Rationale for POC and progression   - Continued use of HEP    Written Home Exercises Provided: yes.  Exercises were reviewed and Raulito was able to demonstrate them prior to the end of the session.  Raulito demonstrated fair  understanding of the education provided.     See EMR under Patient Instructions for exercises provided 11/23/2020.    Assessment     Rualito tolerated well PT session today. Pt was re-assessed today. Pt ambulated with antalgic gait pattern. Pt demonstrated improvement of lumbar range of motion, but he cont with pain during flexion and extension. Hamstring and quad flexibility has demonstrated improvement since initial eval. B LE and lumbar stability still demonstrated weakness. FOTO lumbar survey demonstrated 40% limitations.  Pt " has met 3/6 STGs. Overall, pt is improving in therapy, but improvement is slowly. Pt is motivated and compliant with therapy. Plan to cont skilled PT session according to protocol.  Pt may benefit from transition into healthy back program as suggested by evaluating therapist.     Raulito is progressing well towards his goals.   Pt prognosis is Good.     Pt will continue to benefit from skilled outpatient physical therapy to address the deficits listed in the problem list box on initial evaluation, provide pt/family education and to maximize pt's level of independence in the home and community environment.     Pt's spiritual, cultural and educational needs considered and pt agreeable to plan of care and goals.     Anticipated barriers to physical therapy: None     Goals:   Short Term Goals:  4 weeks  1. Report decreased in pain at worse less than  <   / =  2  /10  to increase tolerance for functional activities.Goal in progress 12-  2. Pt to increase B popliteal angle by greater than > or = 5 degrees in order to improve flexibility and posture. Goal met 12-  3. Increased LE MMT 1/2  to increase tolerance for ADL and work activities.Goal in progress 12-  4. Pt to increase B Ely's Test by greater than  > or = 5degrees in order to improve flexibility and posture. Goal met 12-  5. Pt to tolerate HEP to improve ROM and independence with ADL's. Goal met 12-  6. Pt to improve range of motion by 25% to allow for improved functional mobility to allow for improvement in IADLs. Goal in progress 12-     Long Term Goals: 8 weeks  1. Report decreased in pain at worse less than  <   / =  1  /10  to increase tolerance for functional mobility.   2 .Pt to increase B popliteal angle by greater than > or = 10 degrees in order to improve flexibility and posture.   3. Increased LE MMT 1 grade to increase tolerance for ADL and work activities.  5. Pt to be Independent with HEP to improve ROM and  independence with ADL's  6. Pt to increase B Ely's Test by greater than > or = 10 degrees in order to improve flexibility and posture.   7. Pt to demonstrate negative Bridge Test in order to show improved core strength for lumbar stabilization.   8. Pt to improve range of motion by 75% to allow for improved functional mobility to allow for improvement in IADLs.     Plan     Cont with POC, progressing with trunk, core and LE flexibility and strengthening.     Jacob Jack, PT   12/14/2020

## 2020-12-16 ENCOUNTER — DOCUMENTATION ONLY (OUTPATIENT)
Dept: REHABILITATION | Facility: HOSPITAL | Age: 65
End: 2020-12-16

## 2020-12-16 NOTE — PROGRESS NOTES
Physical Therapy    Patient did not show up for today's therapy session. Next scheduled appointment 12/23/2020     Cancel: 2    No Show: 4      Neil Mcbride, PT, DPT

## 2020-12-17 ENCOUNTER — OFFICE VISIT (OUTPATIENT)
Dept: FAMILY MEDICINE | Facility: CLINIC | Age: 65
End: 2020-12-17
Payer: MEDICARE

## 2020-12-17 VITALS
WEIGHT: 164.88 LBS | HEIGHT: 67 IN | BODY MASS INDEX: 25.88 KG/M2 | TEMPERATURE: 98 F | SYSTOLIC BLOOD PRESSURE: 106 MMHG | DIASTOLIC BLOOD PRESSURE: 60 MMHG | HEART RATE: 78 BPM | OXYGEN SATURATION: 94 %

## 2020-12-17 DIAGNOSIS — M54.50 CHRONIC MIDLINE LOW BACK PAIN WITHOUT SCIATICA: Primary | ICD-10-CM

## 2020-12-17 DIAGNOSIS — G89.29 CHRONIC MIDLINE LOW BACK PAIN WITHOUT SCIATICA: Primary | ICD-10-CM

## 2020-12-17 DIAGNOSIS — F41.9 ANXIETY: ICD-10-CM

## 2020-12-17 DIAGNOSIS — F33.1 MODERATE EPISODE OF RECURRENT MAJOR DEPRESSIVE DISORDER: ICD-10-CM

## 2020-12-17 PROCEDURE — 99999 PR PBB SHADOW E&M-EST. PATIENT-LVL IV: ICD-10-PCS | Mod: PBBFAC,,, | Performed by: NURSE PRACTITIONER

## 2020-12-17 PROCEDURE — 1101F PT FALLS ASSESS-DOCD LE1/YR: CPT | Mod: CPTII,S$GLB,, | Performed by: NURSE PRACTITIONER

## 2020-12-17 PROCEDURE — 99214 PR OFFICE/OUTPT VISIT, EST, LEVL IV, 30-39 MIN: ICD-10-PCS | Mod: S$GLB,,, | Performed by: NURSE PRACTITIONER

## 2020-12-17 PROCEDURE — 3288F FALL RISK ASSESSMENT DOCD: CPT | Mod: CPTII,S$GLB,, | Performed by: NURSE PRACTITIONER

## 2020-12-17 PROCEDURE — 3288F PR FALLS RISK ASSESSMENT DOCUMENTED: ICD-10-PCS | Mod: CPTII,S$GLB,, | Performed by: NURSE PRACTITIONER

## 2020-12-17 PROCEDURE — 3008F BODY MASS INDEX DOCD: CPT | Mod: CPTII,S$GLB,, | Performed by: NURSE PRACTITIONER

## 2020-12-17 PROCEDURE — 1159F PR MEDICATION LIST DOCUMENTED IN MEDICAL RECORD: ICD-10-PCS | Mod: S$GLB,,, | Performed by: NURSE PRACTITIONER

## 2020-12-17 PROCEDURE — 1159F MED LIST DOCD IN RCRD: CPT | Mod: S$GLB,,, | Performed by: NURSE PRACTITIONER

## 2020-12-17 PROCEDURE — 1101F PR PT FALLS ASSESS DOC 0-1 FALLS W/OUT INJ PAST YR: ICD-10-PCS | Mod: CPTII,S$GLB,, | Performed by: NURSE PRACTITIONER

## 2020-12-17 PROCEDURE — 1125F AMNT PAIN NOTED PAIN PRSNT: CPT | Mod: S$GLB,,, | Performed by: NURSE PRACTITIONER

## 2020-12-17 PROCEDURE — 3008F PR BODY MASS INDEX (BMI) DOCUMENTED: ICD-10-PCS | Mod: CPTII,S$GLB,, | Performed by: NURSE PRACTITIONER

## 2020-12-17 PROCEDURE — 99214 OFFICE O/P EST MOD 30 MIN: CPT | Mod: S$GLB,,, | Performed by: NURSE PRACTITIONER

## 2020-12-17 PROCEDURE — 1125F PR PAIN SEVERITY QUANTIFIED, PAIN PRESENT: ICD-10-PCS | Mod: S$GLB,,, | Performed by: NURSE PRACTITIONER

## 2020-12-17 PROCEDURE — 99999 PR PBB SHADOW E&M-EST. PATIENT-LVL IV: CPT | Mod: PBBFAC,,, | Performed by: NURSE PRACTITIONER

## 2020-12-17 RX ORDER — BUPROPION HYDROCHLORIDE 150 MG/1
150 TABLET ORAL DAILY
Qty: 90 TABLET | Refills: 3 | Status: SHIPPED | OUTPATIENT
Start: 2020-12-17 | End: 2021-01-29 | Stop reason: SDUPTHER

## 2020-12-17 RX ORDER — ACETAMINOPHEN 325 MG/1
325 TABLET ORAL EVERY 6 HOURS PRN
COMMUNITY
Start: 2020-12-17 | End: 2022-04-14

## 2020-12-17 NOTE — PROGRESS NOTES
Subjective:       Patient ID: Leonie Denis is a 65 y.o. male.    Chief Complaint: Depression and Stress    65-year-old male presents to clinic today with his wife with complaint of increased anxiety and depression over the last several months.  He takes Lexapro which she said helped.  He saw Dr. Frye on 10/05/2020 was started on 20 Lexapro p.o. daily he states it is not helping all this time. He has been out of Lexapro for the past week.  He tried his wife's trazodone 50 mg without any relief.  He thinks that his depression anxiety is worse because  his brother  3 weeks ago.  He denies any suicidal thoughts, homicidal ideations, or hallucinations.  He also complains of chronic lower back pain.  He states several years ago he had epidural injections in his lower back which helped.  He would like to see a specialist for further evaluation.  He denies any pain, numbness, tingling, or weakness of lower extremities.  He denies any bowel or urinary habits.  He request pain medication for his back.  I explained to him that he would have to discuss this with .     Past Medical History:   Diagnosis Date    Depression     Hypertension      History reviewed. No pertinent surgical history.   reports that he has been smoking. He does not have any smokeless tobacco history on file. He reports current alcohol use. He reports that he does not use drugs.  Review of Systems   Respiratory: Negative for cough, shortness of breath and wheezing.    Cardiovascular: Negative for chest pain, palpitations and leg swelling.   Gastrointestinal: Negative for abdominal pain, diarrhea, nausea and vomiting.   Musculoskeletal: Positive for back pain. Negative for gait problem.        Chronic lower back pain    Neurological: Negative for dizziness, numbness and headaches.   Psychiatric/Behavioral: Negative for hallucinations and suicidal ideas. The patient is nervous/anxious.         Depression        Objective:      Physical  Exam  Constitutional:       General: He is not in acute distress.     Appearance: Normal appearance. He is not ill-appearing, toxic-appearing or diaphoretic.   Cardiovascular:      Rate and Rhythm: Normal rate and regular rhythm.      Heart sounds: Normal heart sounds. No murmur.   Pulmonary:      Effort: Pulmonary effort is normal.      Breath sounds: Normal breath sounds. No wheezing or rhonchi.   Abdominal:      General: Bowel sounds are normal.      Palpations: Abdomen is soft.      Tenderness: There is no abdominal tenderness.   Musculoskeletal: Normal range of motion.         General: Tenderness present.      Comments: Tenderness over L4-L5 and bilateral paraspinal muscles    Neurological:      Mental Status: He is alert and oriented to person, place, and time.      Comments: Negative leg raises normal gait          Assessment:       1. Chronic midline low back pain without sciatica    2. Anxiety    3. Moderate episode of recurrent major depressive disorder        Plan:         Chronic midline low back pain without sciatica  -     Ambulatory referral/consult to Pain Clinic; Future; Expected date: 12/24/2020    Anxiety  -     buPROPion (WELLBUTRIN XL) 150 MG TB24 tablet; Take 1 tablet (150 mg total) by mouth once daily.  Dispense: 90 tablet; Refill: 3    Moderate episode of recurrent major depressive disorder  -     buPROPion (WELLBUTRIN XL) 150 MG TB24 tablet; Take 1 tablet (150 mg total) by mouth once daily.  Dispense: 90 tablet; Refill: 3  - Start Wellbutrin 150 XL daily

## 2020-12-18 ENCOUNTER — TELEPHONE (OUTPATIENT)
Dept: FAMILY MEDICINE | Facility: CLINIC | Age: 65
End: 2020-12-18

## 2020-12-18 NOTE — TELEPHONE ENCOUNTER
This pt was a no show to Covid-19 testing ordered by Dr. Harvey. 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 Pt instructed to contact Dr. Harvey's staff for further instruction. Pt verbalized understanding.

## 2020-12-21 ENCOUNTER — TELEPHONE (OUTPATIENT)
Dept: ENDOSCOPY | Facility: HOSPITAL | Age: 65
End: 2020-12-21

## 2020-12-21 NOTE — TELEPHONE ENCOUNTER
Patient did not have COVID screening test done. Left 2 voicemail messages to reschedule colonoscopy and direct number given to call back.

## 2021-01-15 ENCOUNTER — PATIENT OUTREACH (OUTPATIENT)
Dept: ADMINISTRATIVE | Facility: HOSPITAL | Age: 66
End: 2021-01-15

## 2021-01-28 DIAGNOSIS — Z12.11 SPECIAL SCREENING FOR MALIGNANT NEOPLASMS, COLON: Primary | ICD-10-CM

## 2021-01-28 RX ORDER — SODIUM, POTASSIUM,MAG SULFATES 17.5-3.13G
1 SOLUTION, RECONSTITUTED, ORAL ORAL DAILY
Qty: 1 KIT | Refills: 0 | Status: SHIPPED | OUTPATIENT
Start: 2021-01-28 | End: 2021-01-30

## 2021-01-29 ENCOUNTER — OFFICE VISIT (OUTPATIENT)
Dept: FAMILY MEDICINE | Facility: CLINIC | Age: 66
End: 2021-01-29
Payer: MEDICARE

## 2021-01-29 VITALS
HEIGHT: 67 IN | HEART RATE: 70 BPM | TEMPERATURE: 98 F | DIASTOLIC BLOOD PRESSURE: 55 MMHG | WEIGHT: 166.75 LBS | BODY MASS INDEX: 26.17 KG/M2 | OXYGEN SATURATION: 97 % | SYSTOLIC BLOOD PRESSURE: 90 MMHG

## 2021-01-29 DIAGNOSIS — N52.9 ERECTILE DYSFUNCTION, UNSPECIFIED ERECTILE DYSFUNCTION TYPE: ICD-10-CM

## 2021-01-29 DIAGNOSIS — M62.830 MUSCLE SPASM OF BACK: ICD-10-CM

## 2021-01-29 DIAGNOSIS — F41.9 ANXIETY: ICD-10-CM

## 2021-01-29 DIAGNOSIS — E86.0 DEHYDRATION: Primary | ICD-10-CM

## 2021-01-29 DIAGNOSIS — F33.1 MODERATE EPISODE OF RECURRENT MAJOR DEPRESSIVE DISORDER: ICD-10-CM

## 2021-01-29 PROBLEM — F32.A ANXIETY AND DEPRESSION: Chronic | Status: ACTIVE | Noted: 2020-10-05

## 2021-01-29 PROBLEM — M62.81 WEAKNESS OF TRUNK MUSCULATURE: Status: RESOLVED | Noted: 2020-11-10 | Resolved: 2021-01-29

## 2021-01-29 PROBLEM — R29.898 LOWER EXTREMITY WEAKNESS: Status: RESOLVED | Noted: 2020-11-10 | Resolved: 2021-01-29

## 2021-01-29 PROBLEM — M53.86 DECREASED RANGE OF MOTION OF LUMBAR SPINE: Status: RESOLVED | Noted: 2020-11-10 | Resolved: 2021-01-29

## 2021-01-29 PROBLEM — Z72.0 TOBACCO ABUSE: Chronic | Status: ACTIVE | Noted: 2020-10-05

## 2021-01-29 PROCEDURE — 1101F PT FALLS ASSESS-DOCD LE1/YR: CPT | Mod: CPTII,S$GLB,, | Performed by: FAMILY MEDICINE

## 2021-01-29 PROCEDURE — 3288F FALL RISK ASSESSMENT DOCD: CPT | Mod: CPTII,S$GLB,, | Performed by: FAMILY MEDICINE

## 2021-01-29 PROCEDURE — 1101F PR PT FALLS ASSESS DOC 0-1 FALLS W/OUT INJ PAST YR: ICD-10-PCS | Mod: CPTII,S$GLB,, | Performed by: FAMILY MEDICINE

## 2021-01-29 PROCEDURE — 3008F PR BODY MASS INDEX (BMI) DOCUMENTED: ICD-10-PCS | Mod: CPTII,S$GLB,, | Performed by: FAMILY MEDICINE

## 2021-01-29 PROCEDURE — 1159F PR MEDICATION LIST DOCUMENTED IN MEDICAL RECORD: ICD-10-PCS | Mod: S$GLB,,, | Performed by: FAMILY MEDICINE

## 2021-01-29 PROCEDURE — 1125F PR PAIN SEVERITY QUANTIFIED, PAIN PRESENT: ICD-10-PCS | Mod: S$GLB,,, | Performed by: FAMILY MEDICINE

## 2021-01-29 PROCEDURE — 99999 PR PBB SHADOW E&M-EST. PATIENT-LVL III: CPT | Mod: PBBFAC,,, | Performed by: FAMILY MEDICINE

## 2021-01-29 PROCEDURE — 3008F BODY MASS INDEX DOCD: CPT | Mod: CPTII,S$GLB,, | Performed by: FAMILY MEDICINE

## 2021-01-29 PROCEDURE — 99214 OFFICE O/P EST MOD 30 MIN: CPT | Mod: S$GLB,,, | Performed by: FAMILY MEDICINE

## 2021-01-29 PROCEDURE — 3288F PR FALLS RISK ASSESSMENT DOCUMENTED: ICD-10-PCS | Mod: CPTII,S$GLB,, | Performed by: FAMILY MEDICINE

## 2021-01-29 PROCEDURE — 1125F AMNT PAIN NOTED PAIN PRSNT: CPT | Mod: S$GLB,,, | Performed by: FAMILY MEDICINE

## 2021-01-29 PROCEDURE — 1159F MED LIST DOCD IN RCRD: CPT | Mod: S$GLB,,, | Performed by: FAMILY MEDICINE

## 2021-01-29 PROCEDURE — 99214 PR OFFICE/OUTPT VISIT, EST, LEVL IV, 30-39 MIN: ICD-10-PCS | Mod: S$GLB,,, | Performed by: FAMILY MEDICINE

## 2021-01-29 PROCEDURE — 99999 PR PBB SHADOW E&M-EST. PATIENT-LVL III: ICD-10-PCS | Mod: PBBFAC,,, | Performed by: FAMILY MEDICINE

## 2021-01-29 RX ORDER — ESCITALOPRAM OXALATE 20 MG/1
20 TABLET ORAL DAILY
COMMUNITY
Start: 2020-12-24 | End: 2022-04-14

## 2021-01-29 RX ORDER — SILDENAFIL CITRATE 20 MG/1
20 TABLET ORAL DAILY PRN
Qty: 30 TABLET | Refills: 0 | Status: SHIPPED | OUTPATIENT
Start: 2021-01-29 | End: 2021-01-29 | Stop reason: SDUPTHER

## 2021-01-29 RX ORDER — TIZANIDINE 2 MG/1
2 TABLET ORAL EVERY 8 HOURS PRN
Qty: 30 TABLET | Refills: 3 | Status: SHIPPED | OUTPATIENT
Start: 2021-01-29 | End: 2021-09-25

## 2021-01-29 RX ORDER — SILDENAFIL CITRATE 20 MG/1
20 TABLET ORAL DAILY PRN
Qty: 30 TABLET | Refills: 3 | Status: SHIPPED | OUTPATIENT
Start: 2021-01-29 | End: 2022-05-23

## 2021-01-29 RX ORDER — BUPROPION HYDROCHLORIDE 300 MG/1
300 TABLET ORAL DAILY
Qty: 90 TABLET | Refills: 3 | Status: SHIPPED | OUTPATIENT
Start: 2021-01-29 | End: 2022-04-14

## 2021-02-18 ENCOUNTER — PATIENT OUTREACH (OUTPATIENT)
Dept: ADMINISTRATIVE | Facility: OTHER | Age: 66
End: 2021-02-18

## 2021-03-03 ENCOUNTER — TELEPHONE (OUTPATIENT)
Dept: ENDOSCOPY | Facility: HOSPITAL | Age: 66
End: 2021-03-03

## 2021-03-08 ENCOUNTER — TELEPHONE (OUTPATIENT)
Dept: ENDOSCOPY | Facility: HOSPITAL | Age: 66
End: 2021-03-08

## 2021-04-05 ENCOUNTER — TELEPHONE (OUTPATIENT)
Dept: SMOKING CESSATION | Facility: CLINIC | Age: 66
End: 2021-04-05

## 2021-04-07 ENCOUNTER — PATIENT OUTREACH (OUTPATIENT)
Dept: ADMINISTRATIVE | Facility: OTHER | Age: 66
End: 2021-04-07

## 2021-04-07 DIAGNOSIS — Z12.11 COLON CANCER SCREENING: Primary | ICD-10-CM

## 2021-08-05 DIAGNOSIS — S52.502D CLOSED FRACTURE OF DISTAL END OF LEFT RADIUS WITH ROUTINE HEALING, UNSPECIFIED FRACTURE MORPHOLOGY, SUBSEQUENT ENCOUNTER: Primary | ICD-10-CM

## 2021-08-09 ENCOUNTER — TELEPHONE (OUTPATIENT)
Dept: OPHTHALMOLOGY | Facility: CLINIC | Age: 66
End: 2021-08-09

## 2021-08-12 ENCOUNTER — OFFICE VISIT (OUTPATIENT)
Dept: URGENT CARE | Facility: CLINIC | Age: 66
End: 2021-08-12
Payer: MEDICARE

## 2021-08-12 VITALS
HEIGHT: 67 IN | OXYGEN SATURATION: 95 % | HEART RATE: 76 BPM | DIASTOLIC BLOOD PRESSURE: 65 MMHG | BODY MASS INDEX: 26.06 KG/M2 | WEIGHT: 166 LBS | SYSTOLIC BLOOD PRESSURE: 98 MMHG | TEMPERATURE: 98 F | RESPIRATION RATE: 18 BRPM

## 2021-08-12 DIAGNOSIS — S52.691A OTHER CLOSED FRACTURE OF DISTAL END OF RIGHT ULNA, INITIAL ENCOUNTER: ICD-10-CM
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
## 2021-08-12 DIAGNOSIS — M25.571 ACUTE RIGHT ANKLE PAIN: ICD-10-CM

## 2021-08-12 PROCEDURE — 73110 XR WRIST COMPLETE 3 VIEWS LEFT: ICD-10-PCS | Mod: LT,S$GLB,, | Performed by: RADIOLOGY

## 2021-08-12 PROCEDURE — 73610 X-RAY EXAM OF ANKLE: CPT | Mod: RT,S$GLB,, | Performed by: RADIOLOGY

## 2021-08-12 PROCEDURE — 73110 X-RAY EXAM OF WRIST: CPT | Mod: LT,S$GLB,, | Performed by: RADIOLOGY

## 2021-08-12 PROCEDURE — 1159F PR MEDICATION LIST DOCUMENTED IN MEDICAL RECORD: ICD-10-PCS | Mod: CPTII,S$GLB,, | Performed by: PHYSICIAN ASSISTANT

## 2021-08-12 PROCEDURE — 73630 X-RAY EXAM OF FOOT: CPT | Mod: RT,S$GLB,, | Performed by: RADIOLOGY

## 2021-08-12 PROCEDURE — 3074F PR MOST RECENT SYSTOLIC BLOOD PRESSURE < 130 MM HG: ICD-10-PCS | Mod: CPTII,S$GLB,, | Performed by: PHYSICIAN ASSISTANT

## 2021-08-12 PROCEDURE — 3078F PR MOST RECENT DIASTOLIC BLOOD PRESSURE < 80 MM HG: ICD-10-PCS | Mod: CPTII,S$GLB,, | Performed by: PHYSICIAN ASSISTANT

## 2021-08-12 PROCEDURE — 99215 PR OFFICE/OUTPT VISIT, EST, LEVL V, 40-54 MIN: ICD-10-PCS | Mod: S$GLB,,, | Performed by: PHYSICIAN ASSISTANT

## 2021-08-12 PROCEDURE — 3008F BODY MASS INDEX DOCD: CPT | Mod: CPTII,S$GLB,, | Performed by: PHYSICIAN ASSISTANT

## 2021-08-12 PROCEDURE — 99215 OFFICE O/P EST HI 40 MIN: CPT | Mod: S$GLB,,, | Performed by: PHYSICIAN ASSISTANT

## 2021-08-12 PROCEDURE — 3074F SYST BP LT 130 MM HG: CPT | Mod: CPTII,S$GLB,, | Performed by: PHYSICIAN ASSISTANT

## 2021-08-12 PROCEDURE — 73630 XR FOOT COMPLETE 3 VIEW RIGHT: ICD-10-PCS | Mod: RT,S$GLB,, | Performed by: RADIOLOGY

## 2021-08-12 PROCEDURE — 73610 XR ANKLE COMPLETE 3 VIEW RIGHT: ICD-10-PCS | Mod: RT,S$GLB,, | Performed by: RADIOLOGY

## 2021-08-12 PROCEDURE — 1159F MED LIST DOCD IN RCRD: CPT | Mod: CPTII,S$GLB,, | Performed by: PHYSICIAN ASSISTANT

## 2021-08-12 PROCEDURE — 3008F PR BODY MASS INDEX (BMI) DOCUMENTED: ICD-10-PCS | Mod: CPTII,S$GLB,, | Performed by: PHYSICIAN ASSISTANT

## 2021-08-12 PROCEDURE — 3078F DIAST BP <80 MM HG: CPT | Mod: CPTII,S$GLB,, | Performed by: PHYSICIAN ASSISTANT

## 2021-08-12 RX ORDER — TRAMADOL HYDROCHLORIDE 50 MG/1
50 TABLET ORAL EVERY 8 HOURS PRN
Qty: 10 TABLET | Refills: 0 | Status: SHIPPED | OUTPATIENT
Start: 2021-08-12 | End: 2021-08-17

## 2021-08-12 RX ORDER — IBUPROFEN 800 MG/1
800 TABLET ORAL 3 TIMES DAILY
Qty: 30 TABLET | Refills: 0 | Status: SHIPPED | OUTPATIENT
Start: 2021-08-12 | End: 2021-08-22

## 2021-08-16 ENCOUNTER — DOCUMENTATION ONLY (OUTPATIENT)
Dept: REHABILITATION | Facility: HOSPITAL | Age: 66
End: 2021-08-16

## 2021-08-24 ENCOUNTER — OFFICE VISIT (OUTPATIENT)
Dept: URGENT CARE | Facility: CLINIC | Age: 66
End: 2021-08-24
Payer: MEDICARE

## 2021-08-24 VITALS
RESPIRATION RATE: 18 BRPM | HEIGHT: 67 IN | WEIGHT: 171 LBS | SYSTOLIC BLOOD PRESSURE: 126 MMHG | TEMPERATURE: 98 F | HEART RATE: 77 BPM | DIASTOLIC BLOOD PRESSURE: 71 MMHG | OXYGEN SATURATION: 96 % | BODY MASS INDEX: 26.84 KG/M2

## 2021-08-24 DIAGNOSIS — M54.9 DORSALGIA, UNSPECIFIED: ICD-10-CM

## 2021-08-24 DIAGNOSIS — M54.2 NECK PAIN: ICD-10-CM
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
## 2021-08-24 DIAGNOSIS — S52.502D CLOSED FRACTURE OF DISTAL END OF LEFT RADIUS WITH ROUTINE HEALING, UNSPECIFIED FRACTURE MORPHOLOGY, SUBSEQUENT ENCOUNTER: ICD-10-CM

## 2021-08-24 DIAGNOSIS — M25.532 LEFT WRIST PAIN: ICD-10-CM

## 2021-08-24 PROCEDURE — 1160F PR REVIEW ALL MEDS BY PRESCRIBER/CLIN PHARMACIST DOCUMENTED: ICD-10-PCS | Mod: CPTII,S$GLB,, | Performed by: NURSE PRACTITIONER

## 2021-08-24 PROCEDURE — 3008F BODY MASS INDEX DOCD: CPT | Mod: CPTII,S$GLB,, | Performed by: NURSE PRACTITIONER

## 2021-08-24 PROCEDURE — 3078F PR MOST RECENT DIASTOLIC BLOOD PRESSURE < 80 MM HG: ICD-10-PCS | Mod: CPTII,S$GLB,, | Performed by: NURSE PRACTITIONER

## 2021-08-24 PROCEDURE — 73090 XR FOREARM LEFT: ICD-10-PCS | Mod: LT,S$GLB,, | Performed by: RADIOLOGY

## 2021-08-24 PROCEDURE — 72040 XR CERVICAL SPINE 2 OR 3 VIEWS: ICD-10-PCS | Mod: S$GLB,,, | Performed by: RADIOLOGY

## 2021-08-24 PROCEDURE — 3074F SYST BP LT 130 MM HG: CPT | Mod: CPTII,S$GLB,, | Performed by: NURSE PRACTITIONER

## 2021-08-24 PROCEDURE — 72100 X-RAY EXAM L-S SPINE 2/3 VWS: CPT | Mod: S$GLB,,, | Performed by: RADIOLOGY

## 2021-08-24 PROCEDURE — 72100 XR LUMBAR SPINE 2 OR 3 VIEWS: ICD-10-PCS | Mod: S$GLB,,, | Performed by: RADIOLOGY

## 2021-08-24 PROCEDURE — 72040 X-RAY EXAM NECK SPINE 2-3 VW: CPT | Mod: S$GLB,,, | Performed by: RADIOLOGY

## 2021-08-24 PROCEDURE — 99213 PR OFFICE/OUTPT VISIT, EST, LEVL III, 20-29 MIN: ICD-10-PCS | Mod: S$GLB,,, | Performed by: NURSE PRACTITIONER

## 2021-08-24 PROCEDURE — 3074F PR MOST RECENT SYSTOLIC BLOOD PRESSURE < 130 MM HG: ICD-10-PCS | Mod: CPTII,S$GLB,, | Performed by: NURSE PRACTITIONER

## 2021-08-24 PROCEDURE — 99213 OFFICE O/P EST LOW 20 MIN: CPT | Mod: S$GLB,,, | Performed by: NURSE PRACTITIONER

## 2021-08-24 PROCEDURE — 1159F PR MEDICATION LIST DOCUMENTED IN MEDICAL RECORD: ICD-10-PCS | Mod: CPTII,S$GLB,, | Performed by: NURSE PRACTITIONER

## 2021-08-24 PROCEDURE — 1159F MED LIST DOCD IN RCRD: CPT | Mod: CPTII,S$GLB,, | Performed by: NURSE PRACTITIONER

## 2021-08-24 PROCEDURE — 1160F RVW MEDS BY RX/DR IN RCRD: CPT | Mod: CPTII,S$GLB,, | Performed by: NURSE PRACTITIONER

## 2021-08-24 PROCEDURE — 73090 X-RAY EXAM OF FOREARM: CPT | Mod: LT,S$GLB,, | Performed by: RADIOLOGY

## 2021-08-24 PROCEDURE — 3008F PR BODY MASS INDEX (BMI) DOCUMENTED: ICD-10-PCS | Mod: CPTII,S$GLB,, | Performed by: NURSE PRACTITIONER

## 2021-08-24 PROCEDURE — 3078F DIAST BP <80 MM HG: CPT | Mod: CPTII,S$GLB,, | Performed by: NURSE PRACTITIONER

## 2021-08-25 ENCOUNTER — CLINICAL SUPPORT (OUTPATIENT)
Dept: REHABILITATION | Facility: HOSPITAL | Age: 66
End: 2021-08-25
Payer: MEDICARE

## 2021-08-25 DIAGNOSIS — M25.632 DECREASED RANGE OF MOTION OF LEFT WRIST: ICD-10-CM

## 2021-08-25 PROCEDURE — 97166 OT EVAL MOD COMPLEX 45 MIN: CPT

## 2021-09-15 ENCOUNTER — DOCUMENTATION ONLY (OUTPATIENT)
Dept: REHABILITATION | Facility: HOSPITAL | Age: 66
End: 2021-09-15

## 2021-09-25 DIAGNOSIS — M62.830 MUSCLE SPASM OF BACK: ICD-10-CM

## 2021-09-25 RX ORDER — TIZANIDINE 2 MG/1
2 TABLET ORAL EVERY 8 HOURS PRN
Qty: 30 TABLET | Refills: 3 | Status: SHIPPED | OUTPATIENT
Start: 2021-09-25 | End: 2021-10-05

## 2021-10-22 RX ORDER — TIZANIDINE 2 MG/1
2 TABLET ORAL EVERY 8 HOURS PRN
COMMUNITY
End: 2021-10-22 | Stop reason: SDUPTHER

## 2021-10-22 RX ORDER — TRAMADOL HYDROCHLORIDE 50 MG/1
50 TABLET ORAL EVERY 6 HOURS PRN
COMMUNITY
End: 2022-04-14

## 2021-10-22 RX ORDER — METHOCARBAMOL 500 MG/1
TABLET, FILM COATED ORAL
OUTPATIENT
Start: 2021-10-22

## 2021-10-22 RX ORDER — TRAMADOL HYDROCHLORIDE 50 MG/1
50 TABLET ORAL EVERY 8 HOURS PRN
Qty: 10 TABLET | Refills: 0 | OUTPATIENT
Start: 2021-10-22

## 2021-10-22 RX ORDER — TIZANIDINE 2 MG/1
2 TABLET ORAL EVERY 8 HOURS PRN
Qty: 30 TABLET | Refills: 3 | Status: SHIPPED | OUTPATIENT
Start: 2021-10-22 | End: 2022-04-06

## 2021-10-22 RX ORDER — METHOCARBAMOL 500 MG/1
TABLET, FILM COATED ORAL
COMMUNITY
Start: 2021-08-18 | End: 2022-04-14

## 2021-11-18 ENCOUNTER — TELEPHONE (OUTPATIENT)
Dept: SMOKING CESSATION | Facility: CLINIC | Age: 66
End: 2021-11-18
Payer: MEDICARE

## 2021-11-26 ENCOUNTER — TELEPHONE (OUTPATIENT)
Dept: FAMILY MEDICINE | Facility: CLINIC | Age: 66
End: 2021-11-26
Payer: MEDICARE

## 2021-12-01 ENCOUNTER — OFFICE VISIT (OUTPATIENT)
Dept: OPTOMETRY | Facility: CLINIC | Age: 66
End: 2021-12-01
Payer: COMMERCIAL

## 2021-12-01 ENCOUNTER — TELEPHONE (OUTPATIENT)
Dept: OPHTHALMOLOGY | Facility: CLINIC | Age: 66
End: 2021-12-01
Payer: MEDICARE

## 2021-12-01 DIAGNOSIS — Z53.21 PATIENT LEFT BEFORE EVALUATION BY PHYSICIAN: Primary | ICD-10-CM

## 2021-12-01 PROCEDURE — 99999 PR PBB SHADOW E&M-EST. PATIENT-LVL I: ICD-10-PCS | Mod: PBBFAC,,, | Performed by: OPTOMETRIST

## 2021-12-01 PROCEDURE — 99999 PR PBB SHADOW E&M-EST. PATIENT-LVL I: CPT | Mod: PBBFAC,,, | Performed by: OPTOMETRIST

## 2021-12-01 PROCEDURE — 99499 UNLISTED E&M SERVICE: CPT | Mod: S$GLB,,, | Performed by: OPTOMETRIST

## 2021-12-01 PROCEDURE — 99499 NO LOS: ICD-10-PCS | Mod: S$GLB,,, | Performed by: OPTOMETRIST

## 2021-12-02 ENCOUNTER — TELEPHONE (OUTPATIENT)
Dept: OPHTHALMOLOGY | Facility: CLINIC | Age: 66
End: 2021-12-02
Payer: MEDICARE

## 2021-12-02 ENCOUNTER — TELEPHONE (OUTPATIENT)
Dept: OPTOMETRY | Facility: CLINIC | Age: 66
End: 2021-12-02
Payer: MEDICARE

## 2021-12-09 ENCOUNTER — TELEPHONE (OUTPATIENT)
Dept: OPHTHALMOLOGY | Facility: CLINIC | Age: 66
End: 2021-12-09
Payer: MEDICARE

## 2021-12-13 ENCOUNTER — TELEPHONE (OUTPATIENT)
Dept: OPHTHALMOLOGY | Facility: CLINIC | Age: 66
End: 2021-12-13
Payer: MEDICARE

## 2021-12-23 ENCOUNTER — PES CALL (OUTPATIENT)
Dept: ADMINISTRATIVE | Facility: CLINIC | Age: 66
End: 2021-12-23
Payer: MEDICARE

## 2021-12-27 ENCOUNTER — TELEPHONE (OUTPATIENT)
Dept: OPHTHALMOLOGY | Facility: CLINIC | Age: 66
End: 2021-12-27
Payer: MEDICARE

## 2022-02-15 ENCOUNTER — OFFICE VISIT (OUTPATIENT)
Dept: OPHTHALMOLOGY | Facility: CLINIC | Age: 67
End: 2022-02-15
Attending: OPHTHALMOLOGY
Payer: MEDICARE

## 2022-02-15 DIAGNOSIS — H25.13 NUCLEAR SCLEROSIS, BILATERAL: Primary | ICD-10-CM

## 2022-02-15 DIAGNOSIS — Z01.818 PRE-OP TESTING: ICD-10-CM

## 2022-02-15 PROCEDURE — 92136 IOL MASTER - OU - BOTH EYES: ICD-10-PCS | Mod: HCNC,LT,S$GLB, | Performed by: OPHTHALMOLOGY

## 2022-02-15 PROCEDURE — 1159F MED LIST DOCD IN RCRD: CPT | Mod: HCNC,CPTII,S$GLB, | Performed by: OPHTHALMOLOGY

## 2022-02-15 PROCEDURE — 1159F PR MEDICATION LIST DOCUMENTED IN MEDICAL RECORD: ICD-10-PCS | Mod: HCNC,CPTII,S$GLB, | Performed by: OPHTHALMOLOGY

## 2022-02-15 PROCEDURE — 92136 OPHTHALMIC BIOMETRY: CPT | Mod: HCNC,LT,S$GLB, | Performed by: OPHTHALMOLOGY

## 2022-02-15 PROCEDURE — 1160F PR REVIEW ALL MEDS BY PRESCRIBER/CLIN PHARMACIST DOCUMENTED: ICD-10-PCS | Mod: HCNC,CPTII,S$GLB, | Performed by: OPHTHALMOLOGY

## 2022-02-15 PROCEDURE — 99204 OFFICE O/P NEW MOD 45 MIN: CPT | Mod: HCNC,S$GLB,, | Performed by: OPHTHALMOLOGY

## 2022-02-15 PROCEDURE — 1126F AMNT PAIN NOTED NONE PRSNT: CPT | Mod: HCNC,CPTII,S$GLB, | Performed by: OPHTHALMOLOGY

## 2022-02-15 PROCEDURE — 3288F PR FALLS RISK ASSESSMENT DOCUMENTED: ICD-10-PCS | Mod: HCNC,CPTII,S$GLB, | Performed by: OPHTHALMOLOGY

## 2022-02-15 PROCEDURE — 1160F RVW MEDS BY RX/DR IN RCRD: CPT | Mod: HCNC,CPTII,S$GLB, | Performed by: OPHTHALMOLOGY

## 2022-02-15 PROCEDURE — 1126F PR PAIN SEVERITY QUANTIFIED, NO PAIN PRESENT: ICD-10-PCS | Mod: HCNC,CPTII,S$GLB, | Performed by: OPHTHALMOLOGY

## 2022-02-15 PROCEDURE — 1101F PR PT FALLS ASSESS DOC 0-1 FALLS W/OUT INJ PAST YR: ICD-10-PCS | Mod: HCNC,CPTII,S$GLB, | Performed by: OPHTHALMOLOGY

## 2022-02-15 PROCEDURE — 99999 PR PBB SHADOW E&M-EST. PATIENT-LVL III: CPT | Mod: PBBFAC,HCNC,, | Performed by: OPHTHALMOLOGY

## 2022-02-15 PROCEDURE — 99999 PR PBB SHADOW E&M-EST. PATIENT-LVL III: ICD-10-PCS | Mod: PBBFAC,HCNC,, | Performed by: OPHTHALMOLOGY

## 2022-02-15 PROCEDURE — 3288F FALL RISK ASSESSMENT DOCD: CPT | Mod: HCNC,CPTII,S$GLB, | Performed by: OPHTHALMOLOGY

## 2022-02-15 PROCEDURE — 1101F PT FALLS ASSESS-DOCD LE1/YR: CPT | Mod: HCNC,CPTII,S$GLB, | Performed by: OPHTHALMOLOGY

## 2022-02-15 PROCEDURE — 99204 PR OFFICE/OUTPT VISIT, NEW, LEVL IV, 45-59 MIN: ICD-10-PCS | Mod: HCNC,S$GLB,, | Performed by: OPHTHALMOLOGY

## 2022-02-15 RX ORDER — SODIUM CHLORIDE 0.9 % (FLUSH) 0.9 %
10 SYRINGE (ML) INJECTION
Status: SHIPPED | OUTPATIENT
Start: 2022-02-15

## 2022-02-15 RX ORDER — TROPICAMIDE 10 MG/ML
1 SOLUTION/ DROPS OPHTHALMIC
Status: CANCELLED | OUTPATIENT
Start: 2022-02-15

## 2022-02-15 RX ORDER — PHENYLEPHRINE HYDROCHLORIDE 25 MG/ML
1 SOLUTION/ DROPS OPHTHALMIC
Status: CANCELLED | OUTPATIENT
Start: 2022-02-15

## 2022-02-15 RX ORDER — MOXIFLOXACIN 5 MG/ML
1 SOLUTION/ DROPS OPHTHALMIC
Status: CANCELLED | OUTPATIENT
Start: 2022-02-15

## 2022-02-15 RX ORDER — TETRACAINE HYDROCHLORIDE 5 MG/ML
1 SOLUTION OPHTHALMIC
Status: CANCELLED | OUTPATIENT
Start: 2022-02-15

## 2022-02-15 RX ORDER — PREDNISOLONE ACETATE-GATIFLOXACIN-BROMFENAC .75; 5; 1 MG/ML; MG/ML; MG/ML
1 SUSPENSION/ DROPS OPHTHALMIC 3 TIMES DAILY
Qty: 5 ML | Refills: 3 | Status: SHIPPED | OUTPATIENT
Start: 2022-02-15

## 2022-02-15 NOTE — PROGRESS NOTES
HPI     Referred by Dr Eduardo    No eye surgery   No eyedrops    Pt here for cataract eval.  Pt states he has difficulty driving at night   and difficulty with glare.      Last edited by Millicent Bedolla MA on 2/15/2022  2:41 PM.   (History)            Assessment /Plan     For exam results, see Encounter Report.    Nuclear sclerosis, bilateral      Visually Significant Cataract: Patient reports decreased vision consistent with the clinical amount of lenticular opacity, which reaches the level of visual significance and affects activities of daily living. Risks, benefits, and alternatives to cataract surgery were discussed and the consent reviewed. IOL options were discussed, including ATIOLs and the associated side effects and additional patient cost associated with them.   IOL Selections:   Right eye  IOL: diboo 21.0      Left eye  IOL: diboo 20.5     Pt wishes to have left eye done first.  Large cyl OD, but pt declines toric IOLs   Will need strong glasses after surgery.

## 2022-03-18 ENCOUNTER — TELEPHONE (OUTPATIENT)
Dept: OPHTHALMOLOGY | Facility: CLINIC | Age: 67
End: 2022-03-18
Payer: MEDICARE

## 2022-03-18 DIAGNOSIS — H25.12 NUCLEAR SCLEROTIC CATARACT OF LEFT EYE: Primary | ICD-10-CM

## 2022-04-06 RX ORDER — TIZANIDINE 2 MG/1
2 TABLET ORAL EVERY 8 HOURS
Qty: 270 TABLET | Refills: 0 | Status: SHIPPED | OUTPATIENT
Start: 2022-04-06

## 2022-04-06 NOTE — TELEPHONE ENCOUNTER
rx sent x 1 last OV with Dr. Frye 1 year or greater. Have pt set up OV with Dr. Frye for follow up

## 2022-04-07 ENCOUNTER — TELEPHONE (OUTPATIENT)
Dept: OPHTHALMOLOGY | Facility: CLINIC | Age: 67
End: 2022-04-07
Payer: MEDICARE

## 2022-04-07 DIAGNOSIS — H25.11 NUCLEAR SCLEROTIC CATARACT OF RIGHT EYE: Primary | ICD-10-CM

## 2022-04-07 NOTE — TELEPHONE ENCOUNTER
Patient given arrival time of 10:30 am on Monday March 11. Nothing to eat or drink after 9 pm.  Water, Gatorade after 9 pm until leaves home.  Start drops into the operative eye today. [address]

## 2022-04-14 ENCOUNTER — TELEPHONE (OUTPATIENT)
Dept: FAMILY MEDICINE | Facility: CLINIC | Age: 67
End: 2022-04-14
Payer: MEDICARE

## 2022-04-14 DIAGNOSIS — F32.A ANXIETY AND DEPRESSION: Primary | ICD-10-CM

## 2022-04-14 DIAGNOSIS — F41.9 ANXIETY AND DEPRESSION: Primary | ICD-10-CM

## 2022-04-14 RX ORDER — ESCITALOPRAM OXALATE 10 MG/1
10 TABLET ORAL DAILY
Qty: 30 TABLET | Refills: 3 | Status: SHIPPED | OUTPATIENT
Start: 2022-04-14

## 2022-04-14 NOTE — TELEPHONE ENCOUNTER
Pt presented to clinic this afternoon with his spouse who is my pt. Mr. Denis exhibited s/s of anxiety during his wife's clinic visit. 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 Pt was previously prescribed wellbutin 300 mg and lexapro 20 mg. Pt states he has not taken medication for depression or anxiety in > 6 months. Pt has Hx of anxiety and depression. Pt denies thoughts of self harm or harming others.     Restart lexapro at 10 mg qd, f/u x 2 wks. Refer to .     ED precautions given.   Notify provider if symptoms do not resolve or increase in severity.   Patient verbalizes understanding and agrees with plan of care.

## 2022-04-18 ENCOUNTER — TELEPHONE (OUTPATIENT)
Dept: BEHAVIORAL HEALTH | Facility: CLINIC | Age: 67
End: 2022-04-18
Payer: MEDICARE

## 2022-04-18 NOTE — PROGRESS NOTES
Patient was referred to the [facility] Non-Opioid program by PCP. CHW reached out to patient who stated [he/she] was not interested in enrolling in the [facility] Non-Opioid program.  CHW informed patient to notify [his/her] PCP of [his/her] wishes to engage in the [facility] Non-Opioid  program in the future.  CHW sent follow-up message to PCP via EPIC.

## 2022-04-28 ENCOUNTER — TELEPHONE (OUTPATIENT)
Dept: OPHTHALMOLOGY | Facility: CLINIC | Age: 67
End: 2022-04-28
Payer: MEDICARE

## 2022-04-28 NOTE — TELEPHONE ENCOUNTER
Patient given arrival time of 9:30 am on Monday May 2 . Nothing to eat or drink after 9 pm.  Water, Gatorade after 9 pm until leaves home.  Start drops into the operative eye today. [address]

## 2022-05-02 ENCOUNTER — ANESTHESIA (OUTPATIENT)
Dept: SURGERY | Facility: OTHER | Age: 67
End: 2022-05-02
Payer: MEDICARE

## 2022-05-02 ENCOUNTER — ANESTHESIA EVENT (OUTPATIENT)
Dept: SURGERY | Facility: OTHER | Age: 67
End: 2022-05-02
Payer: MEDICARE

## 2022-05-02 ENCOUNTER — HOSPITAL ENCOUNTER (OUTPATIENT)
Facility: OTHER | Age: 67
Discharge: HOME | End: 2022-05-02
Attending: OPHTHALMOLOGY | Admitting: OPHTHALMOLOGY
Payer: MEDICARE

## 2022-05-02 VITALS
BODY MASS INDEX: 27.94 KG/M2 | OXYGEN SATURATION: 97 % | SYSTOLIC BLOOD PRESSURE: 137 MMHG | HEART RATE: 64 BPM | RESPIRATION RATE: 16 BRPM | WEIGHT: 178 LBS | HEIGHT: 67 IN | DIASTOLIC BLOOD PRESSURE: 79 MMHG | TEMPERATURE: 98 F

## 2022-05-02 DIAGNOSIS — H25.12 NUCLEAR SCLEROTIC CATARACT OF LEFT EYE: Primary | ICD-10-CM

## 2022-05-02 DIAGNOSIS — H25.13 NUCLEAR SCLEROSIS, BILATERAL: ICD-10-CM

## 2022-05-02 PROCEDURE — 36000706: Performed by: OPHTHALMOLOGY

## 2022-05-02 PROCEDURE — 36000707: Performed by: OPHTHALMOLOGY

## 2022-05-02 PROCEDURE — 37000008 HC ANESTHESIA 1ST 15 MINUTES: Performed by: OPHTHALMOLOGY

## 2022-05-02 PROCEDURE — 66984 PR REMOVAL, CATARACT, W/INSRT INTRAOC LENS, W/O ENDO CYCLO: ICD-10-PCS | Mod: LT,,, | Performed by: OPHTHALMOLOGY

## 2022-05-02 PROCEDURE — 71000015 HC POSTOP RECOV 1ST HR: Performed by: OPHTHALMOLOGY

## 2022-05-02 PROCEDURE — 25000003 PHARM REV CODE 250: Performed by: OPHTHALMOLOGY

## 2022-05-02 PROCEDURE — 63600175 PHARM REV CODE 636 W HCPCS: Performed by: NURSE ANESTHETIST, CERTIFIED REGISTERED

## 2022-05-02 PROCEDURE — 37000009 HC ANESTHESIA EA ADD 15 MINS: Performed by: OPHTHALMOLOGY

## 2022-05-02 PROCEDURE — 66984 XCAPSL CTRC RMVL W/O ECP: CPT | Mod: LT,,, | Performed by: OPHTHALMOLOGY

## 2022-05-02 PROCEDURE — V2632 POST CHMBR INTRAOCULAR LENS: HCPCS | Performed by: OPHTHALMOLOGY

## 2022-05-02 DEVICE — LENS EYHANCE +20.5D: Type: IMPLANTABLE DEVICE | Site: EYE | Status: FUNCTIONAL

## 2022-05-02 RX ORDER — MOXIFLOXACIN 5 MG/ML
1 SOLUTION/ DROPS OPHTHALMIC
Status: COMPLETED | OUTPATIENT
Start: 2022-05-02 | End: 2022-05-02

## 2022-05-02 RX ORDER — PROPARACAINE HYDROCHLORIDE 5 MG/ML
1 SOLUTION/ DROPS OPHTHALMIC
Status: DISCONTINUED | OUTPATIENT
Start: 2022-05-02 | End: 2022-05-02 | Stop reason: HOSPADM

## 2022-05-02 RX ORDER — MOXIFLOXACIN 5 MG/ML
SOLUTION/ DROPS OPHTHALMIC
Status: DISCONTINUED | OUTPATIENT
Start: 2022-05-02 | End: 2022-05-02 | Stop reason: HOSPADM

## 2022-05-02 RX ORDER — ACETAMINOPHEN 325 MG/1
650 TABLET ORAL EVERY 4 HOURS PRN
Status: DISCONTINUED | OUTPATIENT
Start: 2022-05-02 | End: 2022-05-02 | Stop reason: HOSPADM

## 2022-05-02 RX ORDER — TETRACAINE HYDROCHLORIDE 5 MG/ML
1 SOLUTION OPHTHALMIC
Status: COMPLETED | OUTPATIENT
Start: 2022-05-02 | End: 2022-05-02

## 2022-05-02 RX ORDER — LIDOCAINE HYDROCHLORIDE 40 MG/ML
INJECTION, SOLUTION RETROBULBAR
Status: DISCONTINUED | OUTPATIENT
Start: 2022-05-02 | End: 2022-05-02 | Stop reason: HOSPADM

## 2022-05-02 RX ORDER — TETRACAINE HYDROCHLORIDE 5 MG/ML
SOLUTION OPHTHALMIC
Status: DISCONTINUED | OUTPATIENT
Start: 2022-05-02 | End: 2022-05-02 | Stop reason: HOSPADM

## 2022-05-02 RX ORDER — MIDAZOLAM HYDROCHLORIDE 1 MG/ML
INJECTION INTRAMUSCULAR; INTRAVENOUS
Status: DISCONTINUED | OUTPATIENT
Start: 2022-05-02 | End: 2022-05-02

## 2022-05-02 RX ORDER — PHENYLEPHRINE HYDROCHLORIDE 25 MG/ML
1 SOLUTION/ DROPS OPHTHALMIC
Status: COMPLETED | OUTPATIENT
Start: 2022-05-02 | End: 2022-05-02

## 2022-05-02 RX ORDER — TROPICAMIDE 10 MG/ML
1 SOLUTION/ DROPS OPHTHALMIC
Status: COMPLETED | OUTPATIENT
Start: 2022-05-02 | End: 2022-05-02

## 2022-05-02 RX ADMIN — PHENYLEPHRINE HYDROCHLORIDE 1 DROP: 25 SOLUTION/ DROPS OPHTHALMIC at 10:05

## 2022-05-02 RX ADMIN — MOXIFLOXACIN 1 DROP: 5 SOLUTION/ DROPS OPHTHALMIC at 10:05

## 2022-05-02 RX ADMIN — TROPICAMIDE 1 DROP: 10 SOLUTION/ DROPS OPHTHALMIC at 10:05

## 2022-05-02 RX ADMIN — MOXIFLOXACIN 1 DROP: 5 SOLUTION/ DROPS OPHTHALMIC at 11:05

## 2022-05-02 RX ADMIN — MOXIFLOXACIN 1 DROP: 5 SOLUTION/ DROPS OPHTHALMIC at 12:05

## 2022-05-02 RX ADMIN — MIDAZOLAM HYDROCHLORIDE 2 MG: 1 INJECTION, SOLUTION INTRAMUSCULAR; INTRAVENOUS at 11:05

## 2022-05-02 RX ADMIN — TETRACAINE HYDROCHLORIDE 1 DROP: 5 SOLUTION OPHTHALMIC at 10:05

## 2022-05-02 NOTE — ANESTHESIA PREPROCEDURE EVALUATION
05/02/2022  Leonie Denis is a 66 y.o., male.      Pre-op Assessment    I have reviewed the Patient Summary Reports.     I have reviewed the Nursing Notes. I have reviewed the NPO Status.   I have reviewed the Medications.     Review of Systems  Anesthesia Hx:  No problems with previous Anesthesia    Social:  Smoker    Cardiovascular:   Exercise tolerance: good    Pulmonary:  Pulmonary Normal    Renal/:  Renal/ Normal     Hepatic/GI:  Hepatic/GI Normal    Endocrine:  Endocrine Normal    Psych:   Psychiatric History depression          Physical Exam  General: Well nourished    Airway:  Mallampati: II   Mouth Opening: Normal  TM Distance: Normal  Tongue: Normal  Neck ROM: Normal ROM    Dental:  Intact        Anesthesia Plan  Type of Anesthesia, risks & benefits discussed:    Anesthesia Type: MAC  Intra-op Monitoring Plan: Standard ASA Monitors  Post Op Pain Control Plan: multimodal analgesia  Induction:  IV  Informed Consent: Informed consent signed with the Patient and all parties understand the risks and agree with anesthesia plan.  All questions answered.   ASA Score: 2    Ready For Surgery From Anesthesia Perspective.     .

## 2022-05-02 NOTE — DISCHARGE SUMMARY
Outcome: Successful outpatient ophthalmic surgical procedure  Preprinted Instructions given to patient.  Regular diet.  Activity: No restrictions  Meds: see Med Rec  Condition: stable  Follow up: 1 day with Dr Del Cid  Disposition: Home  Diagnosis: s/p eye surgery

## 2022-05-02 NOTE — PLAN OF CARE
Leonie Denis has met all discharge criteria from Phase II. Vital Signs are stable, ambulating  without difficulty. Discharge instructions given, patient verbalized understanding. Discharged from facility via wheelchair in stable condition.

## 2022-05-02 NOTE — ANESTHESIA POSTPROCEDURE EVALUATION
Anesthesia Post Evaluation    Patient: Leonie Denis    Procedure(s) Performed: Procedure(s) (LRB):  EXTRACTION, CATARACT, WITH IOL INSERTION (Left)    Final Anesthesia Type: MAC      Patient location during evaluation: [facility]  Patient participation: Yes- Able to Participate  Level of consciousness: awake and alert  Post-procedure vital signs: reviewed and stable  Pain management: adequate  Airway patency: patent    PONV status at discharge: No PONV  Anesthetic complications: no      Cardiovascular status: blood pressure returned to baseline  Respiratory status: unassisted  Hydration status: euvolemic  Follow-up not needed.          Vitals Value Taken Time   /65 05/02/22 1022   Temp 36.6 °C (97.8 °F) 05/02/22 1022   Pulse 68 05/02/22 1022   Resp 18 05/02/22 1022   SpO2 97 % 05/02/22 1022         No case tracking events are documented in the log.      Pain/Raquel Score: No data recorded

## 2022-05-03 ENCOUNTER — OFFICE VISIT (OUTPATIENT)
Dept: OPHTHALMOLOGY | Facility: CLINIC | Age: 67
End: 2022-05-03
Attending: OPHTHALMOLOGY
Payer: MEDICARE

## 2022-05-03 DIAGNOSIS — H25.12 NUCLEAR SCLEROSIS OF LEFT EYE: ICD-10-CM

## 2022-05-03 DIAGNOSIS — Z98.890 POST-OPERATIVE STATE: Primary | ICD-10-CM

## 2022-05-03 PROCEDURE — 1101F PT FALLS ASSESS-DOCD LE1/YR: CPT | Mod: CPTII,S$GLB,, | Performed by: OPHTHALMOLOGY

## 2022-05-03 PROCEDURE — 3288F FALL RISK ASSESSMENT DOCD: CPT | Mod: CPTII,S$GLB,, | Performed by: OPHTHALMOLOGY

## 2022-05-03 PROCEDURE — 1160F PR REVIEW ALL MEDS BY PRESCRIBER/CLIN PHARMACIST DOCUMENTED: ICD-10-PCS | Mod: CPTII,S$GLB,, | Performed by: OPHTHALMOLOGY

## 2022-05-03 PROCEDURE — 1126F AMNT PAIN NOTED NONE PRSNT: CPT | Mod: CPTII,S$GLB,, | Performed by: OPHTHALMOLOGY

## 2022-05-03 PROCEDURE — 99999 PR PBB SHADOW E&M-EST. PATIENT-LVL II: CPT | Mod: PBBFAC,,, | Performed by: OPHTHALMOLOGY

## 2022-05-03 PROCEDURE — 99999 PR PBB SHADOW E&M-EST. PATIENT-LVL II: ICD-10-PCS | Mod: PBBFAC,,, | Performed by: OPHTHALMOLOGY

## 2022-05-03 PROCEDURE — 1159F PR MEDICATION LIST DOCUMENTED IN MEDICAL RECORD: ICD-10-PCS | Mod: CPTII,S$GLB,, | Performed by: OPHTHALMOLOGY

## 2022-05-03 PROCEDURE — 99024 PR POST-OP FOLLOW-UP VISIT: ICD-10-PCS | Mod: S$GLB,,, | Performed by: OPHTHALMOLOGY

## 2022-05-03 PROCEDURE — 1126F PR PAIN SEVERITY QUANTIFIED, NO PAIN PRESENT: ICD-10-PCS | Mod: CPTII,S$GLB,, | Performed by: OPHTHALMOLOGY

## 2022-05-03 PROCEDURE — 1101F PR PT FALLS ASSESS DOC 0-1 FALLS W/OUT INJ PAST YR: ICD-10-PCS | Mod: CPTII,S$GLB,, | Performed by: OPHTHALMOLOGY

## 2022-05-03 PROCEDURE — 1160F RVW MEDS BY RX/DR IN RCRD: CPT | Mod: CPTII,S$GLB,, | Performed by: OPHTHALMOLOGY

## 2022-05-03 PROCEDURE — 1159F MED LIST DOCD IN RCRD: CPT | Mod: CPTII,S$GLB,, | Performed by: OPHTHALMOLOGY

## 2022-05-03 PROCEDURE — 99024 POSTOP FOLLOW-UP VISIT: CPT | Mod: S$GLB,,, | Performed by: OPHTHALMOLOGY

## 2022-05-03 PROCEDURE — 3288F PR FALLS RISK ASSESSMENT DOCUMENTED: ICD-10-PCS | Mod: CPTII,S$GLB,, | Performed by: OPHTHALMOLOGY

## 2022-05-03 NOTE — PROGRESS NOTES
HPI     67 y/o male presents to clinic for 1 day post op OS    5/2/22 diboo 20.5 OS    Pt states has FBS. Feels like sand. Seeing blue with OS and gold with OD     PGB TID OS    Last edited by Maria Victoria Sargent on 5/3/2022  8:08 AM. (History)            Assessment /Plan     For exam results, see Encounter Report.    Post-operative state    Nuclear sclerosis of left eye      Slit lamp exam:  L/L: nl  K: clear, wound sealed  AC: 1+ cell  Lens: IOL centered and stable    POD1 s/p Phaco/IOL  Appropriate precautions and post op medications reviewed.  Patient instructed to call or come in if symptoms of redness, decreased vision, or pain are experienced.

## 2022-05-10 ENCOUNTER — OFFICE VISIT (OUTPATIENT)
Dept: OPHTHALMOLOGY | Facility: CLINIC | Age: 67
End: 2022-05-10
Attending: OPHTHALMOLOGY
Payer: MEDICARE

## 2022-05-10 DIAGNOSIS — H25.11 NUCLEAR SCLEROSIS OF RIGHT EYE: ICD-10-CM

## 2022-05-10 DIAGNOSIS — Z98.890 POST-OPERATIVE STATE: Primary | ICD-10-CM

## 2022-05-10 DIAGNOSIS — H25.12 NUCLEAR SCLEROSIS OF LEFT EYE: ICD-10-CM

## 2022-05-10 PROCEDURE — 1101F PR PT FALLS ASSESS DOC 0-1 FALLS W/OUT INJ PAST YR: ICD-10-PCS | Mod: CPTII,S$GLB,, | Performed by: OPHTHALMOLOGY

## 2022-05-10 PROCEDURE — 1160F RVW MEDS BY RX/DR IN RCRD: CPT | Mod: CPTII,S$GLB,, | Performed by: OPHTHALMOLOGY

## 2022-05-10 PROCEDURE — 1159F MED LIST DOCD IN RCRD: CPT | Mod: CPTII,S$GLB,, | Performed by: OPHTHALMOLOGY

## 2022-05-10 PROCEDURE — 1101F PT FALLS ASSESS-DOCD LE1/YR: CPT | Mod: CPTII,S$GLB,, | Performed by: OPHTHALMOLOGY

## 2022-05-10 PROCEDURE — 92136 IOL MASTER - OU - BOTH EYES: ICD-10-PCS | Mod: 26,RT,S$GLB, | Performed by: OPHTHALMOLOGY

## 2022-05-10 PROCEDURE — 1159F PR MEDICATION LIST DOCUMENTED IN MEDICAL RECORD: ICD-10-PCS | Mod: CPTII,S$GLB,, | Performed by: OPHTHALMOLOGY

## 2022-05-10 PROCEDURE — 1126F AMNT PAIN NOTED NONE PRSNT: CPT | Mod: CPTII,S$GLB,, | Performed by: OPHTHALMOLOGY

## 2022-05-10 PROCEDURE — 3288F PR FALLS RISK ASSESSMENT DOCUMENTED: ICD-10-PCS | Mod: CPTII,S$GLB,, | Performed by: OPHTHALMOLOGY

## 2022-05-10 PROCEDURE — 99999 PR PBB SHADOW E&M-EST. PATIENT-LVL III: ICD-10-PCS | Mod: PBBFAC,,, | Performed by: OPHTHALMOLOGY

## 2022-05-10 PROCEDURE — 1126F PR PAIN SEVERITY QUANTIFIED, NO PAIN PRESENT: ICD-10-PCS | Mod: CPTII,S$GLB,, | Performed by: OPHTHALMOLOGY

## 2022-05-10 PROCEDURE — 3288F FALL RISK ASSESSMENT DOCD: CPT | Mod: CPTII,S$GLB,, | Performed by: OPHTHALMOLOGY

## 2022-05-10 PROCEDURE — 99024 PR POST-OP FOLLOW-UP VISIT: ICD-10-PCS | Mod: S$GLB,,, | Performed by: OPHTHALMOLOGY

## 2022-05-10 PROCEDURE — 92136 OPHTHALMIC BIOMETRY: CPT | Mod: 26,RT,S$GLB, | Performed by: OPHTHALMOLOGY

## 2022-05-10 PROCEDURE — 1160F PR REVIEW ALL MEDS BY PRESCRIBER/CLIN PHARMACIST DOCUMENTED: ICD-10-PCS | Mod: CPTII,S$GLB,, | Performed by: OPHTHALMOLOGY

## 2022-05-10 PROCEDURE — 99024 POSTOP FOLLOW-UP VISIT: CPT | Mod: S$GLB,,, | Performed by: OPHTHALMOLOGY

## 2022-05-10 PROCEDURE — 99999 PR PBB SHADOW E&M-EST. PATIENT-LVL III: CPT | Mod: PBBFAC,,, | Performed by: OPHTHALMOLOGY

## 2022-05-10 RX ORDER — PHENYLEPHRINE HYDROCHLORIDE 100 MG/ML
1 SOLUTION/ DROPS OPHTHALMIC
Status: CANCELLED | OUTPATIENT
Start: 2022-05-10

## 2022-05-10 RX ORDER — MOXIFLOXACIN 5 MG/ML
1 SOLUTION/ DROPS OPHTHALMIC
Status: CANCELLED | OUTPATIENT
Start: 2022-05-10

## 2022-05-10 RX ORDER — PHENYLEPHRINE HYDROCHLORIDE 25 MG/ML
1 SOLUTION/ DROPS OPHTHALMIC
Status: CANCELLED | OUTPATIENT
Start: 2022-05-10

## 2022-05-10 RX ORDER — TETRACAINE HYDROCHLORIDE 5 MG/ML
1 SOLUTION OPHTHALMIC
Status: CANCELLED | OUTPATIENT
Start: 2022-05-10

## 2022-05-10 RX ORDER — TROPICAMIDE 10 MG/ML
1 SOLUTION/ DROPS OPHTHALMIC
Status: CANCELLED | OUTPATIENT
Start: 2022-05-10

## 2022-05-10 NOTE — PROGRESS NOTES
HPI     65 y/o male presents to clinic for 1 week post op OS    5/2/22 diboo 20.5 OS    Pt states his vision has been doing well since his last visit.     PGB TID OS    Last edited by Ivonne Brewer on 5/10/2022  1:16 PM. (History)            Assessment /Plan     For exam results, see Encounter Report.    Post-operative state    Nuclear sclerosis of left eye      Slit lamp exam:  L/L: nl  K: clear, wound sealed  AC: trace cell  Iris/Lens: IOL centered and stable    POW1 s/p phaco: Surgery healing well with no signs of infection or abnormal inflammation.    Patient wishes to proceed with surgery in the second eye. Risks, benefits, alternatives reviewed. IOL selection reviewed.     Right eye  IOL: diboo 21.0

## 2022-05-18 ENCOUNTER — PES CALL (OUTPATIENT)
Dept: ADMINISTRATIVE | Facility: CLINIC | Age: 67
End: 2022-05-18
Payer: MEDICARE

## 2022-05-18 ENCOUNTER — TELEPHONE (OUTPATIENT)
Dept: OPHTHALMOLOGY | Facility: CLINIC | Age: 67
End: 2022-05-18
Payer: MEDICARE

## 2022-05-18 DIAGNOSIS — H25.11 NUCLEAR SCLEROTIC CATARACT OF RIGHT EYE: Primary | ICD-10-CM

## 2022-05-19 ENCOUNTER — TELEPHONE (OUTPATIENT)
Dept: OPHTHALMOLOGY | Facility: CLINIC | Age: 67
End: 2022-05-19
Payer: MEDICARE

## 2022-05-19 NOTE — TELEPHONE ENCOUNTER
Patient given arrival time of 9:30 am on Monday May 23. Nothing to eat or drink after 9 pm.  Water, Gatorade after 9 pm until leaves home.  Start drops into the operative eye today. [address]

## 2022-05-24 ENCOUNTER — TELEPHONE (OUTPATIENT)
Dept: OPHTHALMOLOGY | Facility: CLINIC | Age: 67
End: 2022-05-24
Payer: MEDICARE

## 2022-05-24 NOTE — TELEPHONE ENCOUNTER
----- Message from Tamera Burch sent at 5/24/2022 10:56 AM CDT -----  Regarding: surgery  Pt is calling to get his surgery rescheduled.    Leonie  @  [phone]
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
## 2022-06-02 ENCOUNTER — TELEPHONE (OUTPATIENT)
Dept: OPHTHALMOLOGY | Facility: CLINIC | Age: 67
End: 2022-06-02
Payer: MEDICARE

## 2022-06-02 NOTE — TELEPHONE ENCOUNTER
Left message for patient that  will be referring  him out to a different doctor @[facility]

## 2022-06-02 NOTE — TELEPHONE ENCOUNTER
----- Message from Eddie Spence sent at 6/2/2022  1:01 PM CDT -----  Regarding: FW: Speak with office  Contact: Leonie Love advise  ----- Message -----  From: Marian Nj MA  Sent: 6/1/2022   4:50 PM CDT  To: Eddie Spence  Subject: RE: Speak with office                            Eddie,     This is a [facility] pt.     Marian   ----- Message -----  From: Eddie Spence  Sent: 6/1/2022   3:34 PM CDT  To: Marian Nj MA  Subject: FW: Speak with office                            Please advise  ----- Message -----  From: Aidee Ayon  Sent: 6/1/2022  12:30 PM CDT  To: Jairo Salmeron  Subject: Speak with office                                Pt is calling to schedule surgery.    [phone]

## 2022-06-08 ENCOUNTER — TELEPHONE (OUTPATIENT)
Dept: OPHTHALMOLOGY | Facility: CLINIC | Age: 67
End: 2022-06-08
Payer: MEDICARE

## 2022-06-08 NOTE — TELEPHONE ENCOUNTER
----- Message from Aidee Ayon sent at 6/8/2022  1:33 PM CDT -----  Regarding: Speak with office  Contact: Leonie Rice is calling to speak with office about someone recommended to remove cataract.    [phone]

## 2022-06-22 RX ORDER — TIZANIDINE 2 MG/1
2 TABLET ORAL EVERY 8 HOURS
Qty: 270 TABLET | Refills: 0 | OUTPATIENT
Start: 2022-06-22

## 2022-06-22 NOTE — TELEPHONE ENCOUNTER
No new care gaps identified.  [facility] Embedded Care Gaps. Reference number: [id number]. 6/22/2022   1:56:04 PM CDT

## 2022-06-22 NOTE — TELEPHONE ENCOUNTER
----- Message from Alyse Maravilla sent at 6/22/2022  1:45 PM CDT -----  Type: RX Refill Request    Who Called: Reece     Have you contacted your pharmacy: yes     Refill or New Rx: refill     RX Name and Strength: tiZANidine (ZANAFLEX) 2 MG tablet    Preferred Pharmacy with phone number:   [facility] (Now [facility]) - [place], [place] - [address]  [address] [address]  Phone: [phone] Fax: [phone]    Local or Mail Order: mail     Would the patient rather a call back or a response via [facility]? call back     Best Call Back Number:Phone: [phone] Fax: [phone]

## 2022-07-27 ENCOUNTER — TELEPHONE (OUTPATIENT)
Dept: ADMINISTRATIVE | Facility: CLINIC | Age: 67
End: 2022-07-27
Payer: MEDICARE

## 2022-08-10 ENCOUNTER — PES CALL (OUTPATIENT)
Dept: ADMINISTRATIVE | Facility: OTHER | Age: 67
End: 2022-08-10
Payer: MEDICARE

## 2022-12-28 ENCOUNTER — PES CALL (OUTPATIENT)
Dept: ADMINISTRATIVE | Facility: CLINIC | Age: 67
End: 2022-12-28
Payer: MEDICAID

## 2023-03-02 ENCOUNTER — TELEPHONE (OUTPATIENT)
Dept: FAMILY MEDICINE | Facility: CLINIC | Age: 68
End: 2023-03-02
Payer: MEDICAID

## 2023-03-02 NOTE — TELEPHONE ENCOUNTER
----- Message from Yovany Rodas sent at 3/2/2023 11:28 AM CST -----  Type: Patient Call Back    Who called: Alyse/ Reece     What is the request in detail: Asking about the update on PT medication     Can the clinic reply by [facility]? No     Would the patient rather a call back or a response via [facility]? Call     Best call back number: [phone]

## 2023-03-08 ENCOUNTER — TELEPHONE (OUTPATIENT)
Dept: FAMILY MEDICINE | Facility: CLINIC | Age: 68
End: 2023-03-08
Payer: MEDICAID

## 2023-03-08 NOTE — TELEPHONE ENCOUNTER
----- Message from Mirtha Freeman sent at 3/8/2023 10:08 AM CST -----  Regarding: [facility]/ Reece/  [phone]  Type: Patient Call Back    Who called:  [facility]    What is the request in detail:  Reece is requesting an order faxed to them for a scooter for patient.  Thank you    Would the patient rather a call back or a response via [facility]?  Call back    Best call back number: [phone]    Additional Information:  Fax number:  [phone]      Thank you

## 2023-03-08 NOTE — TELEPHONE ENCOUNTER
----- Message from Mirtha Freeman sent at 3/8/2023 10:08 AM CST -----  Regarding: [facility]/ Reece/  [phone]  Type: Patient Call Back    Who called:  [facility]    What is the request in detail:  Reece is requesting an order faxed to them for a scooter for patient.  Thank you    Would the patient rather a call back or a response via [facility]?  Call back    Best call back number: [phone]    Additional Information:  Fax number:  [phone]      Thank you

## 2023-05-05 ENCOUNTER — HOSPITAL ENCOUNTER (EMERGENCY)
Facility: HOSPITAL | Age: 68
Discharge: HOME | End: 2023-05-06
Attending: EMERGENCY MEDICINE
Payer: MEDICARE

## 2023-05-05 VITALS
OXYGEN SATURATION: 99 % | TEMPERATURE: 99 F | SYSTOLIC BLOOD PRESSURE: 110 MMHG | DIASTOLIC BLOOD PRESSURE: 60 MMHG | HEART RATE: 85 BPM | RESPIRATION RATE: 18 BRPM

## 2023-05-05 DIAGNOSIS — M54.50 LOW BACK PAIN, UNSPECIFIED BACK PAIN LATERALITY, UNSPECIFIED CHRONICITY, UNSPECIFIED WHETHER SCIATICA PRESENT: ICD-10-CM

## 2023-05-05 DIAGNOSIS — S09.90XA TRAUMATIC INJURY OF HEAD, INITIAL ENCOUNTER: ICD-10-CM

## 2023-05-05 DIAGNOSIS — S89.92XA INJURY OF LEFT KNEE, INITIAL ENCOUNTER: ICD-10-CM
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
## 2023-05-05 DIAGNOSIS — M51.36 DEGENERATIVE LUMBAR DISC: ICD-10-CM

## 2023-05-05 PROCEDURE — 99284 EMERGENCY DEPT VISIT MOD MDM: CPT | Mod: ,,, | Performed by: EMERGENCY MEDICINE

## 2023-05-05 PROCEDURE — 99284 EMERGENCY DEPT VISIT MOD MDM: CPT | Mod: 25

## 2023-05-05 PROCEDURE — 25000003 PHARM REV CODE 250: Performed by: PHYSICIAN ASSISTANT

## 2023-05-05 PROCEDURE — 99284 PR EMERGENCY DEPT VISIT,LEVEL IV: ICD-10-PCS | Mod: ,,, | Performed by: EMERGENCY MEDICINE

## 2023-05-05 RX ORDER — ACETAMINOPHEN 500 MG
1000 TABLET ORAL
Status: COMPLETED | OUTPATIENT
Start: 2023-05-05 | End: 2023-05-05

## 2023-05-05 RX ADMIN — ACETAMINOPHEN 1000 MG: 500 TABLET ORAL at 11:05
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
## 2024-05-10 ENCOUNTER — PATIENT OUTREACH (OUTPATIENT)
Dept: ADMINISTRATIVE | Facility: HOSPITAL | Age: 69
End: 2024-05-10
Payer: MEDICAID

## 2025-01-14 DIAGNOSIS — Z00.00 ENCOUNTER FOR MEDICARE ANNUAL WELLNESS EXAM: ICD-10-CM

## (undated) DEVICE — Device

## (undated) DEVICE — GLOVE BIOGEL SKINSENSE PI 7.5

## (undated) DEVICE — SYR SLIP TIP 1CC

## (undated) DEVICE — SOL BETADINE 5%

## (undated) DEVICE — SOL PVP-I SCRUB 7.5% 4OZ